# Patient Record
Sex: MALE | Race: BLACK OR AFRICAN AMERICAN | NOT HISPANIC OR LATINO | Employment: OTHER
[De-identification: names, ages, dates, MRNs, and addresses within clinical notes are randomized per-mention and may not be internally consistent; named-entity substitution may affect disease eponyms.]

---

## 2019-03-08 ENCOUNTER — DIAGNOSTIC TRANS (OUTPATIENT)
Dept: OTHER | Age: 74
End: 2019-03-08

## 2019-03-08 ENCOUNTER — HOSPITAL (OUTPATIENT)
Dept: OTHER | Age: 74
End: 2019-03-08
Attending: INTERNAL MEDICINE

## 2019-03-09 ENCOUNTER — HOSPITAL (OUTPATIENT)
Dept: OTHER | Age: 74
End: 2019-03-09

## 2019-03-09 LAB
ALBUMIN SERPL-MCNC: 2.6 GM/DL (ref 3.6–5.1)
ALBUMIN/GLOB SERPL: 0.5 {RATIO} (ref 1–2.4)
ALP SERPL-CCNC: 140 UNIT/L (ref 45–117)
ALT SERPL-CCNC: 61 UNIT/L
AMORPH SED URNS QL MICRO: ABNORMAL
ANALYZER ANC (IANC): ABNORMAL
ANION GAP SERPL CALC-SCNC: 11 MMOL/L (ref 10–20)
ANION GAP SERPL CALC-SCNC: 9 MMOL/L (ref 10–20)
APPEARANCE UR: ABNORMAL
AST SERPL-CCNC: 49 UNIT/L
BACTERIA #/AREA URNS HPF: ABNORMAL /HPF
BASOPHILS # BLD: 0 THOUSAND/MCL (ref 0–0.3)
BASOPHILS NFR BLD: 0 %
BILIRUB SERPL-MCNC: 2 MG/DL (ref 0.2–1)
BILIRUB UR QL: NEGATIVE
BUN SERPL-MCNC: 21 MG/DL (ref 6–20)
BUN SERPL-MCNC: 24 MG/DL (ref 6–20)
BUN/CREAT SERPL: 21 (ref 7–25)
BUN/CREAT SERPL: 24 (ref 7–25)
CALCIUM SERPL-MCNC: 8 MG/DL (ref 8.4–10.2)
CALCIUM SERPL-MCNC: 8.6 MG/DL (ref 8.4–10.2)
CAOX CRY URNS QL MICRO: ABNORMAL
CHLORIDE: 106 MMOL/L (ref 98–107)
CHLORIDE: 106 MMOL/L (ref 98–107)
CO2 SERPL-SCNC: 26 MMOL/L (ref 21–32)
CO2 SERPL-SCNC: 29 MMOL/L (ref 21–32)
COLOR UR: ABNORMAL
CREAT SERPL-MCNC: 1.01 MG/DL (ref 0.67–1.17)
CREAT SERPL-MCNC: 1.01 MG/DL (ref 0.67–1.17)
DIFFERENTIAL METHOD BLD: ABNORMAL
EOSINOPHIL # BLD: 0 THOUSAND/MCL (ref 0.1–0.5)
EOSINOPHIL NFR BLD: 0 %
EPITH CASTS #/AREA URNS LPF: ABNORMAL /[LPF]
ERYTHROCYTE [DISTWIDTH] IN BLOOD: 12.9 % (ref 11–15)
FATTY CASTS #/AREA URNS LPF: ABNORMAL /[LPF]
GLOBULIN SER-MCNC: 5.3 GM/DL (ref 2–4)
GLUCOSE SERPL-MCNC: 138 MG/DL (ref 65–99)
GLUCOSE SERPL-MCNC: 153 MG/DL (ref 65–99)
GLUCOSE UR-MCNC: NEGATIVE MG/DL
GRAN CASTS #/AREA URNS LPF: ABNORMAL /[LPF]
HEMATOCRIT: 38.1 % (ref 39–51)
HGB BLD-MCNC: 12.2 GM/DL (ref 13–17)
HGB UR QL: NEGATIVE
HYALINE CASTS #/AREA URNS LPF: ABNORMAL /LPF (ref 0–5)
IMM GRANULOCYTES # BLD AUTO: 0.1 THOUSAND/MCL (ref 0–0.2)
IMM GRANULOCYTES NFR BLD: 1 %
KETONES UR-MCNC: NEGATIVE MG/DL
LEUKOCYTE ESTERASE UR QL STRIP: NEGATIVE
LYMPHOCYTES # BLD: 1.7 THOUSAND/MCL (ref 1–4)
LYMPHOCYTES NFR BLD: 11 %
MAGNESIUM SERPL-MCNC: 2.3 MG/DL (ref 1.7–2.4)
MCH RBC QN AUTO: 28.3 PG (ref 26–34)
MCHC RBC AUTO-ENTMCNC: 32 GM/DL (ref 32–36.5)
MCV RBC AUTO: 88.4 FL (ref 78–100)
MIXED CELL CASTS #/AREA URNS LPF: ABNORMAL /[LPF]
MONOCYTES # BLD: 1.6 THOUSAND/MCL (ref 0.3–0.9)
MONOCYTES NFR BLD: 10 %
MUCOUS THREADS URNS QL MICRO: PRESENT
NEUTROPHILS # BLD: 12.1 THOUSAND/MCL (ref 1.8–7.7)
NEUTROPHILS NFR BLD: 78 %
NEUTS SEG NFR BLD: ABNORMAL %
NITRITE UR QL: NEGATIVE
NRBC (NRBCRE): 0 /100 WBC
PH UR: 5 UNIT (ref 5–7)
PLATELET # BLD: 383 THOUSAND/MCL (ref 140–450)
POTASSIUM SERPL-SCNC: 3.7 MMOL/L (ref 3.4–5.1)
POTASSIUM SERPL-SCNC: 3.7 MMOL/L (ref 3.4–5.1)
PROT SERPL-MCNC: 7.9 GM/DL (ref 6.4–8.2)
PROT UR QL: 100 MG/DL
RBC # BLD: 4.31 MILLION/MCL (ref 4.5–5.9)
RBC #/AREA URNS HPF: ABNORMAL /HPF (ref 0–3)
RBC CASTS #/AREA URNS LPF: ABNORMAL /[LPF]
RENAL EPI CELLS #/AREA URNS HPF: ABNORMAL /[HPF]
SODIUM SERPL-SCNC: 139 MMOL/L (ref 135–145)
SODIUM SERPL-SCNC: 140 MMOL/L (ref 135–145)
SP GR UR: >1.03 (ref 1–1.03)
SPECIMEN SOURCE: ABNORMAL
SPERM URNS QL MICRO: ABNORMAL
SQUAMOUS #/AREA URNS HPF: ABNORMAL /HPF (ref 0–5)
T VAGINALIS URNS QL MICRO: ABNORMAL
TRI-PHOS CRY URNS QL MICRO: ABNORMAL
TROPONIN I SERPL HS-MCNC: 0.15 NG/ML
TROPONIN I SERPL HS-MCNC: 0.17 NG/ML
TROPONIN I SERPL HS-MCNC: <0.02 NG/ML
TROPONIN I SERPL HS-MCNC: <0.02 NG/ML
TSH SERPL-ACNC: 0.8 MCUNIT/ML (ref 0.35–5)
URATE CRY URNS QL MICRO: ABNORMAL
URINE REFLEX: ABNORMAL
URNS CMNT MICRO: ABNORMAL
UROBILINOGEN UR QL: 4 MG/DL (ref 0–1)
WAXY CASTS #/AREA URNS LPF: ABNORMAL /[LPF]
WBC # BLD: 15.6 THOUSAND/MCL (ref 4.2–11)
WBC #/AREA URNS HPF: ABNORMAL /HPF (ref 0–5)
WBC CASTS #/AREA URNS LPF: ABNORMAL /[LPF]
YEAST HYPHAE URNS QL MICRO: ABNORMAL
YEAST URNS QL MICRO: ABNORMAL

## 2019-03-10 LAB
ANALYZER ANC (IANC): ABNORMAL
ANION GAP SERPL CALC-SCNC: 9 MMOL/L (ref 10–20)
BUN SERPL-MCNC: 24 MG/DL (ref 6–20)
BUN/CREAT SERPL: 25 (ref 7–25)
CALCIUM SERPL-MCNC: 8 MG/DL (ref 8.4–10.2)
CHLORIDE: 105 MMOL/L (ref 98–107)
CHOLEST SERPL-MCNC: 88 MG/DL
CHOLEST/HDLC SERPL: 5.9 {RATIO}
CK SERPL-CCNC: 115 UNIT/L (ref 39–308)
CO2 SERPL-SCNC: 28 MMOL/L (ref 21–32)
CREAT SERPL-MCNC: 0.95 MG/DL (ref 0.67–1.17)
ERYTHROCYTE [DISTWIDTH] IN BLOOD: 13.2 % (ref 11–15)
GLUCOSE SERPL-MCNC: 121 MG/DL (ref 65–99)
HDLC SERPL-MCNC: 15 MG/DL
HEMATOCRIT: 35.7 % (ref 39–51)
HGB BLD-MCNC: 11.2 GM/DL (ref 13–17)
LDLC SERPL CALC-MCNC: 46 MG/DL
MCH RBC QN AUTO: 27.9 PG (ref 26–34)
MCHC RBC AUTO-ENTMCNC: 31.4 GM/DL (ref 32–36.5)
MCV RBC AUTO: 88.8 FL (ref 78–100)
NONHDLC SERPL-MCNC: 73 MG/DL
NRBC (NRBCRE): 0 /100 WBC
NT-PROBNP SERPL-MCNC: 4112 PG/ML
PLATELET # BLD: 407 THOUSAND/MCL (ref 140–450)
POTASSIUM SERPL-SCNC: 4 MMOL/L (ref 3.4–5.1)
RBC # BLD: 4.02 MILLION/MCL (ref 4.5–5.9)
SODIUM SERPL-SCNC: 138 MMOL/L (ref 135–145)
TRIGLYCERIDE (TRIGP): 135 MG/DL
TROPONIN I SERPL HS-MCNC: 0.08 NG/ML
WBC # BLD: 12.3 THOUSAND/MCL (ref 4.2–11)

## 2019-03-11 LAB
ANALYZER ANC (IANC): ABNORMAL
ANION GAP SERPL CALC-SCNC: 10 MMOL/L (ref 10–20)
BASOPHILS # BLD: 0 THOUSAND/MCL (ref 0–0.3)
BASOPHILS NFR BLD: 0 %
BUN SERPL-MCNC: 25 MG/DL (ref 6–20)
BUN/CREAT SERPL: 29 (ref 7–25)
CALCIUM SERPL-MCNC: 8.1 MG/DL (ref 8.4–10.2)
CHLORIDE: 104 MMOL/L (ref 98–107)
CO2 SERPL-SCNC: 27 MMOL/L (ref 21–32)
CREAT SERPL-MCNC: 0.86 MG/DL (ref 0.67–1.17)
DIFFERENTIAL METHOD BLD: ABNORMAL
EOSINOPHIL # BLD: 0.2 THOUSAND/MCL (ref 0.1–0.5)
EOSINOPHIL NFR BLD: 2 %
ERYTHROCYTE [DISTWIDTH] IN BLOOD: 13.5 % (ref 11–15)
GLUCOSE SERPL-MCNC: 123 MG/DL (ref 65–99)
HEMATOCRIT: 34.6 % (ref 39–51)
HGB BLD-MCNC: 10.8 GM/DL (ref 13–17)
IMM GRANULOCYTES # BLD AUTO: 0.2 THOUSAND/MCL (ref 0–0.2)
IMM GRANULOCYTES NFR BLD: 2 %
INR PPP: 1.1
LYMPHOCYTES # BLD: 1.5 THOUSAND/MCL (ref 1–4)
LYMPHOCYTES NFR BLD: 14 %
MCH RBC QN AUTO: 27.8 PG (ref 26–34)
MCHC RBC AUTO-ENTMCNC: 31.2 GM/DL (ref 32–36.5)
MCV RBC AUTO: 89.2 FL (ref 78–100)
MONOCYTES # BLD: 1.1 THOUSAND/MCL (ref 0.3–0.9)
MONOCYTES NFR BLD: 11 %
NEUTROPHILS # BLD: 7.5 THOUSAND/MCL (ref 1.8–7.7)
NEUTROPHILS NFR BLD: 71 %
NEUTS SEG NFR BLD: ABNORMAL %
NRBC (NRBCRE): 0 /100 WBC
PLATELET # BLD: 414 THOUSAND/MCL (ref 140–450)
POTASSIUM SERPL-SCNC: 3.7 MMOL/L (ref 3.4–5.1)
PROTHROMBIN TIME: 11.7 SECONDS (ref 9.7–11.8)
PROTHROMBIN TIME: NORMAL
RBC # BLD: 3.88 MILLION/MCL (ref 4.5–5.9)
SODIUM SERPL-SCNC: 137 MMOL/L (ref 135–145)
WBC # BLD: 10.7 THOUSAND/MCL (ref 4.2–11)

## 2019-03-13 LAB
ANION GAP SERPL CALC-SCNC: 5 MMOL/L (ref 10–20)
BUN SERPL-MCNC: 16 MG/DL (ref 6–20)
BUN/CREAT SERPL: 20 (ref 7–25)
CALCIUM SERPL-MCNC: 8.1 MG/DL (ref 8.4–10.2)
CHLORIDE: 106 MMOL/L (ref 98–107)
CO2 SERPL-SCNC: 29 MMOL/L (ref 21–32)
CREAT SERPL-MCNC: 0.8 MG/DL (ref 0.67–1.17)
GLUCOSE SERPL-MCNC: 136 MG/DL (ref 65–99)
POTASSIUM SERPL-SCNC: 3.8 MMOL/L (ref 3.4–5.1)
SODIUM SERPL-SCNC: 136 MMOL/L (ref 135–145)

## 2019-03-14 LAB
ANALYZER ANC (IANC): ABNORMAL
ANION GAP SERPL CALC-SCNC: 9 MMOL/L (ref 10–20)
BASOPHILS # BLD: 0 THOUSAND/MCL (ref 0–0.3)
BASOPHILS NFR BLD: 0 %
BUN SERPL-MCNC: 14 MG/DL (ref 6–20)
BUN/CREAT SERPL: 19 (ref 7–25)
CALCIUM SERPL-MCNC: 8.2 MG/DL (ref 8.4–10.2)
CHLORIDE: 106 MMOL/L (ref 98–107)
CO2 SERPL-SCNC: 28 MMOL/L (ref 21–32)
CREAT SERPL-MCNC: 0.74 MG/DL (ref 0.67–1.17)
DIFFERENTIAL METHOD BLD: ABNORMAL
EOSINOPHIL # BLD: 0.2 THOUSAND/MCL (ref 0.1–0.5)
EOSINOPHIL NFR BLD: 2 %
ERYTHROCYTE [DISTWIDTH] IN BLOOD: 13.5 % (ref 11–15)
GLUCOSE SERPL-MCNC: 107 MG/DL (ref 65–99)
HEMATOCRIT: 33.9 % (ref 39–51)
HGB BLD-MCNC: 10.7 GM/DL (ref 13–17)
HYPOCHROMIA (HYPOC): ABNORMAL
IMM GRANULOCYTES # BLD AUTO: 0.3 THOUSAND/MCL (ref 0–0.2)
IMM GRANULOCYTES NFR BLD: 4 %
LARGE PLATELETS (PLTL): PRESENT
LYMPHOCYTES # BLD: 1.6 THOUSAND/MCL (ref 1–4)
LYMPHOCYTES NFR BLD: 17 %
MACROCYTOSIS (MACRO): ABNORMAL
MCH RBC QN AUTO: 27.9 PG (ref 26–34)
MCHC RBC AUTO-ENTMCNC: 31.6 GM/DL (ref 32–36.5)
MCV RBC AUTO: 88.5 FL (ref 78–100)
MONOCYTES # BLD: 0.7 THOUSAND/MCL (ref 0.3–0.9)
MONOCYTES NFR BLD: 8 %
NEUTROPHILS # BLD: 6.2 THOUSAND/MCL (ref 1.8–7.7)
NEUTROPHILS NFR BLD: 69 %
NEUTS SEG NFR BLD: ABNORMAL %
NRBC (NRBCRE): 0 /100 WBC
PLATELET # BLD: 421 THOUSAND/MCL (ref 140–450)
POTASSIUM SERPL-SCNC: 3.9 MMOL/L (ref 3.4–5.1)
RBC # BLD: 3.83 MILLION/MCL (ref 4.5–5.9)
SODIUM SERPL-SCNC: 139 MMOL/L (ref 135–145)
WBC # BLD: 9.1 THOUSAND/MCL (ref 4.2–11)

## 2021-05-19 ENCOUNTER — OFFICE VISIT (OUTPATIENT)
Dept: DERMATOLOGY | Age: 76
End: 2021-05-19

## 2021-05-19 VITALS
SYSTOLIC BLOOD PRESSURE: 181 MMHG | HEART RATE: 63 BPM | OXYGEN SATURATION: 99 % | HEIGHT: 65 IN | DIASTOLIC BLOOD PRESSURE: 84 MMHG | BODY MASS INDEX: 29.32 KG/M2 | WEIGHT: 176 LBS

## 2021-05-19 DIAGNOSIS — R20.2 NOTALGIA PARESTHETICA: Primary | ICD-10-CM

## 2021-05-19 PROCEDURE — 99203 OFFICE O/P NEW LOW 30 MIN: CPT | Performed by: DERMATOLOGY

## 2021-05-19 RX ORDER — METOPROLOL SUCCINATE 25 MG/1
25 TABLET, EXTENDED RELEASE ORAL DAILY
COMMUNITY

## 2021-05-19 RX ORDER — OLMESARTAN MEDOXOMIL 40 MG/1
TABLET ORAL
COMMUNITY
Start: 2021-04-16

## 2021-05-19 RX ORDER — HYDROXYZINE HYDROCHLORIDE 25 MG/1
25 TABLET, FILM COATED ORAL EVERY 6 HOURS PRN
COMMUNITY
Start: 2021-04-30

## 2021-05-19 RX ORDER — OMEGA-3/DHA/EPA/FISH OIL 35-113.5MG
1000 TABLET,CHEWABLE ORAL DAILY
COMMUNITY
Start: 2021-04-24

## 2021-05-19 RX ORDER — ERGOCALCIFEROL 1.25 MG/1
CAPSULE ORAL
COMMUNITY
Start: 2021-04-22

## 2021-07-14 ENCOUNTER — OFFICE VISIT (OUTPATIENT)
Dept: DERMATOLOGY | Age: 76
End: 2021-07-14

## 2021-07-14 VITALS
WEIGHT: 175.93 LBS | SYSTOLIC BLOOD PRESSURE: 156 MMHG | HEIGHT: 65 IN | BODY MASS INDEX: 29.31 KG/M2 | DIASTOLIC BLOOD PRESSURE: 71 MMHG

## 2021-07-14 DIAGNOSIS — R20.2 NOTALGIA PARESTHETICA: Primary | ICD-10-CM

## 2021-07-14 PROCEDURE — 99213 OFFICE O/P EST LOW 20 MIN: CPT | Performed by: DERMATOLOGY

## 2022-08-22 DIAGNOSIS — Z13.6 ENCOUNTER FOR SCREENING FOR CARDIOVASCULAR DISORDERS: Primary | ICD-10-CM

## 2022-08-22 DIAGNOSIS — R22.43 LOCALIZED SWELLING, MASS AND LUMP, LOWER LIMB, BILATERAL: ICD-10-CM

## 2022-08-23 ENCOUNTER — HOSPITAL ENCOUNTER (OUTPATIENT)
Dept: ULTRASOUND IMAGING | Age: 77
Discharge: HOME | End: 2022-08-23
Attending: NURSE PRACTITIONER

## 2022-08-23 DIAGNOSIS — R22.43 LOCALIZED SWELLING, MASS AND LUMP, LOWER LIMB, BILATERAL: ICD-10-CM

## 2022-08-23 DIAGNOSIS — Z13.6 ENCOUNTER FOR SCREENING FOR CARDIOVASCULAR DISORDERS: ICD-10-CM

## 2022-08-23 PROCEDURE — 93925 LOWER EXTREMITY STUDY: CPT

## 2022-08-23 PROCEDURE — 93970 EXTREMITY STUDY: CPT

## 2023-12-16 ENCOUNTER — APPOINTMENT (OUTPATIENT)
Dept: MRI IMAGING | Facility: HOSPITAL | Age: 78
DRG: 304 | End: 2023-12-16
Attending: EMERGENCY MEDICINE
Payer: MEDICARE

## 2023-12-16 ENCOUNTER — APPOINTMENT (OUTPATIENT)
Dept: GENERAL RADIOLOGY | Facility: HOSPITAL | Age: 78
DRG: 304 | End: 2023-12-16
Attending: EMERGENCY MEDICINE
Payer: MEDICARE

## 2023-12-16 ENCOUNTER — APPOINTMENT (OUTPATIENT)
Dept: MRI IMAGING | Facility: HOSPITAL | Age: 78
End: 2023-12-16
Attending: EMERGENCY MEDICINE
Payer: MEDICARE

## 2023-12-16 ENCOUNTER — APPOINTMENT (OUTPATIENT)
Dept: CT IMAGING | Facility: HOSPITAL | Age: 78
End: 2023-12-16
Attending: EMERGENCY MEDICINE
Payer: MEDICARE

## 2023-12-16 ENCOUNTER — APPOINTMENT (OUTPATIENT)
Dept: GENERAL RADIOLOGY | Facility: HOSPITAL | Age: 78
End: 2023-12-16
Attending: EMERGENCY MEDICINE
Payer: MEDICARE

## 2023-12-16 ENCOUNTER — HOSPITAL ENCOUNTER (INPATIENT)
Facility: HOSPITAL | Age: 78
LOS: 4 days | Discharge: NURSING, REHAB OR LONG-TERM CARE | End: 2023-12-20
Attending: EMERGENCY MEDICINE | Admitting: HOSPITALIST
Payer: MEDICARE

## 2023-12-16 ENCOUNTER — APPOINTMENT (OUTPATIENT)
Dept: CT IMAGING | Facility: HOSPITAL | Age: 78
DRG: 304 | End: 2023-12-16
Attending: EMERGENCY MEDICINE
Payer: MEDICARE

## 2023-12-16 ENCOUNTER — HOSPITAL ENCOUNTER (INPATIENT)
Facility: HOSPITAL | Age: 78
LOS: 4 days | Discharge: NURSING, REHAB OR LONG-TERM CARE | DRG: 304 | End: 2023-12-20
Attending: EMERGENCY MEDICINE | Admitting: HOSPITALIST
Payer: MEDICARE

## 2023-12-16 DIAGNOSIS — I63.9 CEREBROVASCULAR ACCIDENT (CVA), UNSPECIFIED MECHANISM (HCC): ICD-10-CM

## 2023-12-16 DIAGNOSIS — G45.9 TIA (TRANSIENT ISCHEMIC ATTACK): ICD-10-CM

## 2023-12-16 DIAGNOSIS — I16.0 HYPERTENSIVE URGENCY: Primary | ICD-10-CM

## 2023-12-16 PROBLEM — R73.9 HYPERGLYCEMIA: Status: ACTIVE | Noted: 2023-12-16

## 2023-12-16 PROBLEM — E87.6 HYPOKALEMIA: Status: ACTIVE | Noted: 2023-12-16

## 2023-12-16 LAB
ALBUMIN SERPL-MCNC: 3.7 G/DL (ref 3.4–5)
ALBUMIN/GLOB SERPL: 0.9 {RATIO} (ref 1–2)
ALP LIVER SERPL-CCNC: 115 U/L
ALT SERPL-CCNC: 27 U/L
ANION GAP SERPL CALC-SCNC: 4 MMOL/L (ref 0–18)
APTT PPP: 34.2 SECONDS (ref 23.3–35.6)
AST SERPL-CCNC: 26 U/L (ref 15–37)
BASOPHILS # BLD AUTO: 0.02 X10(3) UL (ref 0–0.2)
BASOPHILS NFR BLD AUTO: 0.3 %
BILIRUB SERPL-MCNC: 0.6 MG/DL (ref 0.1–2)
BUN BLD-MCNC: 13 MG/DL (ref 9–23)
CALCIUM BLD-MCNC: 8.5 MG/DL (ref 8.5–10.1)
CHLORIDE SERPL-SCNC: 105 MMOL/L (ref 98–112)
CO2 SERPL-SCNC: 30 MMOL/L (ref 21–32)
CREAT BLD-MCNC: 1.17 MG/DL
EGFRCR SERPLBLD CKD-EPI 2021: 64 ML/MIN/1.73M2 (ref 60–?)
EOSINOPHIL # BLD AUTO: 0.11 X10(3) UL (ref 0–0.7)
EOSINOPHIL NFR BLD AUTO: 1.9 %
ERYTHROCYTE [DISTWIDTH] IN BLOOD BY AUTOMATED COUNT: 11.8 %
GLOBULIN PLAS-MCNC: 3.9 G/DL (ref 2.8–4.4)
GLUCOSE BLD-MCNC: 131 MG/DL (ref 70–99)
HCT VFR BLD AUTO: 50.7 %
HGB BLD-MCNC: 16.2 G/DL
IMM GRANULOCYTES # BLD AUTO: 0.03 X10(3) UL (ref 0–1)
IMM GRANULOCYTES NFR BLD: 0.5 %
INR BLD: 1.12 (ref 0.8–1.2)
LYMPHOCYTES # BLD AUTO: 2.18 X10(3) UL (ref 1–4)
LYMPHOCYTES NFR BLD AUTO: 37.8 %
MCH RBC QN AUTO: 28.8 PG (ref 26–34)
MCHC RBC AUTO-ENTMCNC: 32 G/DL (ref 31–37)
MCV RBC AUTO: 90.2 FL
MONOCYTES # BLD AUTO: 0.64 X10(3) UL (ref 0.1–1)
MONOCYTES NFR BLD AUTO: 11.1 %
NEUTROPHILS # BLD AUTO: 2.79 X10 (3) UL (ref 1.5–7.7)
NEUTROPHILS # BLD AUTO: 2.79 X10(3) UL (ref 1.5–7.7)
NEUTROPHILS NFR BLD AUTO: 48.4 %
OSMOLALITY SERPL CALC.SUM OF ELEC: 290 MOSM/KG (ref 275–295)
PLATELET # BLD AUTO: 203 10(3)UL (ref 150–450)
POTASSIUM SERPL-SCNC: 3.5 MMOL/L (ref 3.5–5.1)
PROT SERPL-MCNC: 7.6 G/DL (ref 6.4–8.2)
PROTHROMBIN TIME: 14.4 SECONDS (ref 11.6–14.8)
RBC # BLD AUTO: 5.62 X10(6)UL
SODIUM SERPL-SCNC: 139 MMOL/L (ref 136–145)
TROPONIN I SERPL HS-MCNC: 11 NG/L
WBC # BLD AUTO: 5.8 X10(3) UL (ref 4–11)

## 2023-12-16 PROCEDURE — 71045 X-RAY EXAM CHEST 1 VIEW: CPT | Performed by: EMERGENCY MEDICINE

## 2023-12-16 PROCEDURE — 70498 CT ANGIOGRAPHY NECK: CPT | Performed by: EMERGENCY MEDICINE

## 2023-12-16 PROCEDURE — 99223 1ST HOSP IP/OBS HIGH 75: CPT | Performed by: HOSPITALIST

## 2023-12-16 PROCEDURE — 70551 MRI BRAIN STEM W/O DYE: CPT | Performed by: EMERGENCY MEDICINE

## 2023-12-16 PROCEDURE — 70496 CT ANGIOGRAPHY HEAD: CPT | Performed by: EMERGENCY MEDICINE

## 2023-12-16 PROCEDURE — 70544 MR ANGIOGRAPHY HEAD W/O DYE: CPT | Performed by: EMERGENCY MEDICINE

## 2023-12-16 RX ORDER — HYDRALAZINE HYDROCHLORIDE 20 MG/ML
10 INJECTION INTRAMUSCULAR; INTRAVENOUS ONCE
Status: COMPLETED | OUTPATIENT
Start: 2023-12-16 | End: 2023-12-16

## 2023-12-16 RX ORDER — HYDRALAZINE HYDROCHLORIDE 20 MG/ML
10 INJECTION INTRAMUSCULAR; INTRAVENOUS EVERY 6 HOURS PRN
Status: DISCONTINUED | OUTPATIENT
Start: 2023-12-16 | End: 2023-12-20

## 2023-12-16 RX ORDER — ONDANSETRON 2 MG/ML
4 INJECTION INTRAMUSCULAR; INTRAVENOUS EVERY 6 HOURS PRN
Status: DISCONTINUED | OUTPATIENT
Start: 2023-12-16 | End: 2023-12-20

## 2023-12-16 RX ORDER — LABETALOL HYDROCHLORIDE 5 MG/ML
10 INJECTION, SOLUTION INTRAVENOUS EVERY 10 MIN PRN
Status: DISCONTINUED | OUTPATIENT
Start: 2023-12-16 | End: 2023-12-20

## 2023-12-16 RX ORDER — LOSARTAN POTASSIUM 50 MG/1
50 TABLET ORAL DAILY
Status: DISCONTINUED | OUTPATIENT
Start: 2023-12-17 | End: 2023-12-20

## 2023-12-16 RX ORDER — LOSARTAN POTASSIUM 25 MG/1
50 TABLET ORAL DAILY
COMMUNITY

## 2023-12-16 RX ORDER — ATORVASTATIN CALCIUM 40 MG/1
40 TABLET, FILM COATED ORAL NIGHTLY
Status: DISCONTINUED | OUTPATIENT
Start: 2023-12-16 | End: 2023-12-20

## 2023-12-16 RX ORDER — ASPIRIN 81 MG/1
81 TABLET, CHEWABLE ORAL ONCE
Status: COMPLETED | OUTPATIENT
Start: 2023-12-16 | End: 2023-12-16

## 2023-12-16 RX ORDER — IOHEXOL 350 MG/ML
75 INJECTION, SOLUTION INTRAVENOUS
Status: COMPLETED | OUTPATIENT
Start: 2023-12-16 | End: 2023-12-16

## 2023-12-16 RX ORDER — MAGNESIUM 30 MG
30 TABLET ORAL 2 TIMES DAILY
COMMUNITY

## 2023-12-16 RX ORDER — FUROSEMIDE 40 MG/1
40 TABLET ORAL DAILY
COMMUNITY

## 2023-12-16 RX ORDER — ASPIRIN 300 MG/1
300 SUPPOSITORY RECTAL DAILY
Status: DISCONTINUED | OUTPATIENT
Start: 2023-12-16 | End: 2023-12-18

## 2023-12-16 RX ORDER — METOPROLOL SUCCINATE 50 MG/1
50 TABLET, EXTENDED RELEASE ORAL DAILY
COMMUNITY

## 2023-12-16 RX ORDER — HYDRALAZINE HYDROCHLORIDE 20 MG/ML
10 INJECTION INTRAMUSCULAR; INTRAVENOUS EVERY 2 HOUR PRN
Status: DISCONTINUED | OUTPATIENT
Start: 2023-12-16 | End: 2023-12-20

## 2023-12-16 RX ORDER — ACETAMINOPHEN 325 MG/1
650 TABLET ORAL EVERY 4 HOURS PRN
Status: DISCONTINUED | OUTPATIENT
Start: 2023-12-16 | End: 2023-12-20

## 2023-12-16 RX ORDER — ACETAMINOPHEN 650 MG/1
650 SUPPOSITORY RECTAL EVERY 4 HOURS PRN
Status: DISCONTINUED | OUTPATIENT
Start: 2023-12-16 | End: 2023-12-20

## 2023-12-16 RX ORDER — HEPARIN SODIUM 5000 [USP'U]/ML
5000 INJECTION, SOLUTION INTRAVENOUS; SUBCUTANEOUS EVERY 8 HOURS SCHEDULED
Status: DISCONTINUED | OUTPATIENT
Start: 2023-12-16 | End: 2023-12-20

## 2023-12-16 RX ORDER — METOCLOPRAMIDE HYDROCHLORIDE 5 MG/ML
5 INJECTION INTRAMUSCULAR; INTRAVENOUS EVERY 8 HOURS PRN
Status: DISCONTINUED | OUTPATIENT
Start: 2023-12-16 | End: 2023-12-20

## 2023-12-16 RX ORDER — ASPIRIN 325 MG
325 TABLET ORAL DAILY
Status: DISCONTINUED | OUTPATIENT
Start: 2023-12-16 | End: 2023-12-18

## 2023-12-16 RX ORDER — METOPROLOL SUCCINATE 50 MG/1
50 TABLET, EXTENDED RELEASE ORAL
Status: DISCONTINUED | OUTPATIENT
Start: 2023-12-17 | End: 2023-12-20

## 2023-12-16 RX ORDER — SODIUM CHLORIDE 9 MG/ML
INJECTION, SOLUTION INTRAVENOUS CONTINUOUS
Status: DISCONTINUED | OUTPATIENT
Start: 2023-12-16 | End: 2023-12-17

## 2023-12-16 NOTE — ED INITIAL ASSESSMENT (HPI)
Patient to ED c/o left leg heaviness and left sided facial tingling that began two days ago as well. Patient also having elevated BP, has not been taking prescribed medications consistently.

## 2023-12-17 ENCOUNTER — APPOINTMENT (OUTPATIENT)
Dept: CV DIAGNOSTICS | Facility: HOSPITAL | Age: 78
DRG: 304 | End: 2023-12-17
Attending: HOSPITALIST
Payer: MEDICARE

## 2023-12-17 ENCOUNTER — APPOINTMENT (OUTPATIENT)
Dept: CV DIAGNOSTICS | Facility: HOSPITAL | Age: 78
End: 2023-12-17
Attending: HOSPITALIST
Payer: MEDICARE

## 2023-12-17 LAB
ATRIAL RATE: 76 BPM
CHOLEST SERPL-MCNC: 125 MG/DL (ref ?–200)
EST. AVERAGE GLUCOSE BLD GHB EST-MCNC: 128 MG/DL (ref 68–126)
HBA1C MFR BLD: 6.1 % (ref ?–5.7)
HDLC SERPL-MCNC: 47 MG/DL (ref 40–59)
LDLC SERPL CALC-MCNC: 57 MG/DL (ref ?–100)
NONHDLC SERPL-MCNC: 78 MG/DL (ref ?–130)
P AXIS: 73 DEGREES
P-R INTERVAL: 180 MS
Q-T INTERVAL: 364 MS
QRS DURATION: 96 MS
QTC CALCULATION (BEZET): 409 MS
R AXIS: 38 DEGREES
T AXIS: 88 DEGREES
TRIGL SERPL-MCNC: 119 MG/DL (ref 30–149)
VENTRICULAR RATE: 76 BPM
VLDLC SERPL CALC-MCNC: 17 MG/DL (ref 0–30)

## 2023-12-17 PROCEDURE — 93306 TTE W/DOPPLER COMPLETE: CPT | Performed by: HOSPITALIST

## 2023-12-17 PROCEDURE — 99233 SBSQ HOSP IP/OBS HIGH 50: CPT | Performed by: HOSPITALIST

## 2023-12-17 PROCEDURE — 99223 1ST HOSP IP/OBS HIGH 75: CPT | Performed by: OTHER

## 2023-12-17 RX ORDER — CLOPIDOGREL BISULFATE 75 MG/1
75 TABLET ORAL DAILY
Status: DISCONTINUED | OUTPATIENT
Start: 2023-12-17 | End: 2023-12-20

## 2023-12-17 NOTE — ED QUICK NOTES
Orders for admission, patient is aware of plan and ready to go upstairs. Any questions, please call ED RN paul  at extension [phone].      Patient Covid vaccination status: Unvaccinated     COVID Test Ordered in ED: None    COVID Suspicion at Admission: N/A    Running Infusions:  None    Mental Status/LOC at time of transport: a/ox4    Other pertinent information: MRI ETA 2200= PT MAY GO TO FLOOR FIRST IF BED AVAIL  CIWA score: N/A   NIH score:  0

## 2023-12-17 NOTE — SLP NOTE
ADULT SWALLOWING EVALUATION    ASSESSMENT    PERTINENT BACKGROUND INFORMATION:  Patient is a 66year-old male who was admitted on 12/16/23 with TIA (transient ischemic attack) [G45.9] Hypertensive urgency [I16.0]. Imaging results reviewed and noted below. Currently on RA with adequate SPO2 saturations of 98%. PMH is significant for HTN which may impact care. Patient resides at home with spouse. Prior to this hospitalization, patient was consuming a regular diet with thin liquids. Patient indicates no speech or swallow complaints. Son confirmed that his speech sounds normal to him and that it probably sounded garbled in the beginning of my visit as he was relaxing in chair and maybe napping. Passed RN swallow screening. Patient is currently on a regular consistency diet with thin liquids. ASSESSMENT:   Patient was awake and alert. Able to maintain midline position with adequate trunk and head control when upright in bedside chair. Oral motor mechanism exam was essentially unremarkable except for reduced coordination with increasing speed. Speech clarity was inconsistent during this visit with moments of 100% clarity to dysarthria with reduced intelligibility in context known. Son had difficulty understanding him at times and was asking his father to repeat himself. However, son stated that he thinks he sounds normal despite waxing and waning of speech clarity. Patient presented with WNL oropharyngeal skills with absent signs/symptoms of aspiration during the controlled conditions of this exam and when swallow mechanism was challenged via consecutive swallow of thin liquid by straw. He does have an impulsive nature with large bites/sips but was able to manage. Please see below objective section for details. No further follow-up with swallow skills warranted. RECOMMENDATIONS/PLAN:  -  Recommend continue regular diet/thin liquids with implementation of general swallow support strategies outlined below. - Cognitive-communication evaluation to be completed if 2nd MRI is stroke positive. EDUCATION:  - Patient/family verbalized understanding to results and recommendations of this evaluation and was in agreement. - Spoke with RN Clemencia Kingsley. RECOMMENDATIONS   Diet Recommendations - Solids: Regular  Diet Recommendations - Liquids: Thin Liquids                           Aspiration Precautions: Upright position; Slow rate;Small bites and sips  Medication Administration Recommendations: No restrictions  Treatment Plan/Recommendations:  (cog-comm eval if 2nd MRI is stroke positive)  Discharge Recommendations/Plan: Undetermined    HISTORY   MEDICAL HISTORY  Reason for Referral: Stroke protocol    Problem List  Principal Problem:    Hypertensive urgency  Active Problems:    Hypokalemia    Hyperglycemia    TIA (transient ischemic attack)      Past Medical History  Past Medical History:   Diagnosis Date    Essential hypertension     High blood pressure      Past Surgical History:   Procedure Laterality Date    REPAIR ING HERNIA,5+Y/O,REDUCIBL           Prior Living Situation: Home with spouse  Diet Prior to Admission: Regular; Thin liquids  Precautions: Aspiration    Patient/Family Goals: not stated    SWALLOWING HISTORY  Current Diet Consistency: Regular; Thin liquids  Dysphagia History: none    IMAGING  MRI BRAIN/MRA BRAIN   CONCLUSION:    1. No evidence of an acute infarct. 2. Sequelae of marked severe chronic small vessel ischemic disease. 3. Mild global parenchymal volume loss. 4. Multiple foci of gradient susceptibility in the brain parenchyma may be sequelae of chronic hemorrhagic products. 5. 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 The left posterior cerebral artery distal to this is widely patent.       LOCATION:  [facility]      Dictated by (CST): Mohan Esqueda MD on 12/16/2023 at 11:10 PM       Finalized by (CST): Mohan Esqueda MD on 12/16/2023 at 11:16 PM         XR CHEST AP PORTABLE   CONCLUSION:  Minimal pulmonary vascular congestion. No consolidation. LOCATION:  [facility]      Dictated by (CST): Freddie Rodriguez MD on 12/16/2023 at 7:21 PM       Finalized by (CST): Freddie Rodriguez MD on 12/16/2023 at 7:23 PM     SUBJECTIVE   Son present for this visit who confirmed that his speech sounds the same before this hospitalization. OBJECTIVE   ORAL MOTOR EXAMINATION  Dentition: Natural  Symmetry: Within Functional Limits  Strength: Within Functional Limits     Range of Motion: Within Functional Limits  Rate of Motion: Reduced    Voice Quality: Clear  Respiratory Status: Unlabored  Consistencies Trialed: Thin liquids;Puree;Hard solid  Method of Presentation: Self presentation  Patient Positioning: Upright;Midline    Oral Phase of Swallow: Within Functional Limits                      Pharyngeal Phase of Swallow: Within Functional Limits           (Please note: Silent aspiration cannot be evaluated clinically.  Videofluoroscopic Swallow Study is required to rule-out silent aspiration.)    Esophageal Phase of Swallow: No complaints consistent with possible esophageal involvement  Comments:               GOALS  Goal #1 Cognitive-communication evaluation if 2nd MRI is stroke positive  Goal Established           FOLLOW UP  Treatment Plan/Recommendations:  (cog-comm eval if 2nd MRI is stroke positive)  Number of Visits to Meet Established Goals: 1  Follow Up Needed (Documentation Required): Yes  SLP Follow-up Date: 12/18/23    Thank you for your referral.   If you have any questions, please contact PETE Harding

## 2023-12-17 NOTE — PLAN OF CARE
Assumed care at 0730  Alert and oriented x4  RA. NSR on tele. VSS  Denies any pain  Neuros q2 until 12pm. No new neuro changes. Continues to have intermittent numbness/tingling on left side and left leg weakness  Up 2 with walker and gait belt, pt very unsteady and drags left leg  Echo done.    MRI brain needed  PT/OT rec AR  Pt/family updated on POC  Safety precautions in place

## 2023-12-17 NOTE — PHYSICAL THERAPY NOTE
PHYSICAL THERAPY EVALUATION - INPATIENT     Room Number: 5660/0194-O  Evaluation Date: 12/17/2023  Type of Evaluation: Initial  Physician Order: PT Eval and Treat    Presenting Problem: L side weakness and numnbess, hypertensive urgency  Co-Morbidities : HTN  Reason for Therapy: Mobility Dysfunction and Discharge Planning    History related to current admission: Patient is a 66year old male admitted on 12/16/2023 from home for L sided weakness and numbness. Pt diagnosed with hypertensive urgency. SPB in . Also with possible TIA. MRI brain negative for acute event per imaging report. ASSESSMENT   In this PT evaluation, the patient presents with the following impairments dec coordination L LE, dec functional strength, dec dynamic and static standing balance. These impairments and comorbidities manifest themselves as functional limitations in independent bed mobility, transfers, gait and stair negotiation. The patient is below baseline and would benefit from skilled inpatient PT to address the above deficits to assist patient in returning to prior to level of function. Functional outcome measures completed include AMPAC. The AM-PAC '6-Clicks' Inpatient Basic Mobility Short Form was completed and this patient is demonstrating a Approx Degree of Impairment: 54.16%  degree of impairment in mobility. Research supports that patients with this level of impairment may benefit from Acute rehabilitation. Pt is typically ind without assistive device, good activity tolerance, motivated to participate in therapies and would benefit from coordinated, intensive therapy with oversight of physiatry and rehab nursing in order to achieve max level of independence. DISCHARGE RECOMMENDATIONS  PT Discharge Recommendations: Acute rehabilitation    PLAN  PT Treatment Plan: Bed mobility; Body mechanics; Endurance; Energy conservation;Patient education;Gait training;Neuromuscular re-educate;Strengthening;Transfer training;Balance training  Rehab Potential : Good  Frequency (Obs): 3-5x/week  Number of Visits to Meet Established Goals: 4      CURRENT GOALS    Goal #1 Patient is able to demonstrate supine - sit EOB @ level: supervision     Goal #2 Patient is able to demonstrate transfers EOB to/from Chair/Wheelchair at assistance level: supervision     Goal #3 Patient is able to ambulate 100 feet with assist device: walker - rolling at assistance level: supervision     Goal #4    Goal #5    Goal #6    Goal Comments: Goals established on 2023    HOME SITUATION  Type of Home: [address]: One level  Stairs to Enter : 3  Railing: Yes  Stairs to Bedroom: 0       Lives With: Significant other  Drives: Yes  Patient Owned Equipment: None  Patient Regularly Uses: Glasses    Prior Level of [place]: Pt typically independent with all self care and mobility, denies recent history of falls. Lives in [place], was visiting dtr in [facility] when symptoms began.     SUBJECTIVE  \"It feels heavy\"      OBJECTIVE  Precautions: Bed/chair alarm (-190)  Fall Risk: High fall risk    WEIGHT BEARING RESTRICTION  Weight Bearing Restriction: None                PAIN ASSESSMENT  Ratin          COGNITION  Arousal/Alertness:  appropriate responses to stimuli  Orientation Level:  oriented x4  Following Commands:  follows one step commands consistently  Motor Planning: impaired  Awareness of Deficits:  fully aware of deficits    RANGE OF MOTION AND STRENGTH ASSESSMENT  Upper extremity ROM and strength: see OT note    Lower extremity ROM is within functional limits     Lower extremity strength is within functional limits Left Hip flexion  5/5  Left Knee extension  5/5  Left Knee flexion  5/5  Left Dorsiflexion  5/5  *noted 5/5 strength when placed in position, difficulty coordinating movement for testing    BALANCE  Static Sitting: Good  Dynamic Sitting: Good  Static Standing: Fair -  Dynamic Standing: Poor    ADDITIONAL TESTS  Additional Tests: Modified [place]              Modified [place]: 5                  ACTIVITY TOLERANCE  Pulse: 78  Heart Rate Source: Monitor     BP: 158/78  BP Location: Left arm  BP Method: Automatic  Patient Position: Sitting    O2 WALK       NEUROLOGICAL FINDINGS  Neurological Findings: Sensation; Tone           Sensation: intact to light touch L LE  Tone: wfl      AM-PAC '6-Clicks' INPATIENT SHORT FORM - BASIC MOBILITY  How much difficulty does the patient currently have. .. Patient Difficulty: Turning over in bed (including adjusting bedclothes, sheets and blankets)?: A Little   Patient Difficulty: Sitting down on and standing up from a chair with arms (e.g., wheelchair, bedside commode, etc.): A Little   Patient Difficulty: Moving from lying on back to sitting on the side of the bed?: A Little   How much help from another person does the patient currently need. .. Help from Another: Moving to and from a bed to a chair (including a wheelchair)?: A Little   Help from Another: Need to walk in hospital room?: A Lot   Help from Another: Climbing 3-5 steps with a railing?: A Lot       AM-PAC Score:  Raw Score: 16   Approx Degree of Impairment: 54.16%   Standardized Score (AM-PAC Scale): 40.78   CMS Modifier (G-Code): CK    FUNCTIONAL ABILITY STATUS  Gait Assessment   Functional Mobility/Gait Assessment  Gait Assistance: Moderate assistance  Distance (ft): 10  Assistive Device: Rolling walker  Pattern: L Flexed knee;L Foot drag;Shuffle    Skilled Therapy Provided     Bed Mobility:  Rolling: mod I  Supine to sit: min assist with L LE only   Sit to supine: NT     Transfer Mobility:  Sit to stand: mod assist   Stand to sit: min assist  Gait = mod assist with balance and L LE movement, assist with walker mgmt, cues for posture. Therapist's Comments: Pt presents sidelying in bed, agreeable to PT. Mobility as above. Noted decreased coordination L LE, see OT note for L UE deficits. Pt with good sitting balance. Leans slightly to left in standing and during gait. SLow gait speed with difficulty advancing L LE. Exercise/Education Provided:  Bed mobility  Body mechanics  Functional activity tolerated  Gait training  Posture  Transfer training    Patient End of Session: Up in chair;Needs met;Call light within reach;RN aware of session/findings; All patient questions and concerns addressed; Family present; Alarm set      Patient Evaluation Complexity Level:  History Low - no personal factors and/or co-morbidities   Examination of body systems Moderate - addressing a total of 3 or more elements   Clinical Presentation Moderate - Evolving   Clinical Decision Making Low - Stable       PT Session Time: 30 minutes  Gait Training: 10 minutes  Therapeutic Activity: 10 minutes

## 2023-12-17 NOTE — CM/SW NOTE
Acute rehab recommended by therapy team per progress note review; CM ordered Consult to Physical Medicine & Rehab (Physiatry) for acute rehab evaluation per protocol. CM/SW to follow up for Physiatry recommendation and additional discharge planning needs.     Myriam Sky RN Case Manager [id number]

## 2023-12-17 NOTE — PLAN OF CARE
Stroke booklet given to patient; the following education was provided:     What is stroke  BEFAST - Stroke warning sings and symptoms  How to initiate EMS  Secondary stroke prevention and personalized risk factors: including HTN  Lifestyle modifications (nutrition and exercise)  Post-stroke recovery and follow-up  Community resources (stroke support group and non-emergent stroke line)  Patient's identified goal: \"I will check blood pressures every day and log them when I get home. \"    Patient's family present during education. They are all receptive to teachings. All pertinent questions and concerns were addressed. Patient has chosen Adis Wiggins (daughter) as his/her designated care partner. He/she can be reached at [phone].       RN Stroke Navigator team  [phone]

## 2023-12-17 NOTE — PLAN OF CARE
Pt. A&O x4, on RA, NSR on tele. BP slightly elevated but within parameters. No c/o pain. Only c/o slight tingling in left side of lips but only when standing. MRI negative so stroke protocol was discontinued by the hospitalist. Saline locked. Fall and safety precautions in place. Plan of care ongoing. 0400- Re-emergence of symptoms around 0400 when pt. Ambulated with staff to the bathroom. L leg felt very heavy per pt. and did not bear weight easily, L arm numbness, MD notified. Problem: CARDIOVASCULAR - ADULT  Goal: Maintains optimal cardiac output and hemodynamic stability  Description: INTERVENTIONS:  - Monitor vital signs, rhythm, and trends  - Monitor for bleeding, hypotension and signs of decreased cardiac output  - Evaluate effectiveness of vasoactive medications to optimize hemodynamic stability  - Monitor arterial and/or venous puncture sites for bleeding and/or hematoma  - Assess quality of pulses, skin color and temperature  - Assess for signs of decreased coronary artery perfusion - ex. Angina  - Evaluate fluid balance, assess for edema, trend weights  Outcome: Progressing     Problem: NEUROLOGICAL - ADULT  Goal: Achieves stable or improved neurological status  Description: INTERVENTIONS  - Assess for and report changes in neurological status  - Initiate measures to prevent increased intracranial pressure  - Maintain blood pressure and fluid volume within ordered parameters to optimize cerebral perfusion and minimize risk of hemorrhage  - Monitor temperature, glucose, and sodium.  Initiate appropriate interventions as ordered  Outcome: Progressing

## 2023-12-18 ENCOUNTER — TELEPHONE (OUTPATIENT)
Dept: NEUROLOGY | Facility: CLINIC | Age: 78
End: 2023-12-18

## 2023-12-18 ENCOUNTER — APPOINTMENT (OUTPATIENT)
Dept: MRI IMAGING | Facility: HOSPITAL | Age: 78
End: 2023-12-18
Attending: NURSE PRACTITIONER
Payer: MEDICARE

## 2023-12-18 ENCOUNTER — APPOINTMENT (OUTPATIENT)
Dept: MRI IMAGING | Facility: HOSPITAL | Age: 78
DRG: 304 | End: 2023-12-18
Attending: NURSE PRACTITIONER
Payer: MEDICARE

## 2023-12-18 PROBLEM — I63.81 ACUTE ISCHEMIC VBA THALAMIC STROKE, RIGHT (HCC): Status: ACTIVE | Noted: 2023-12-18

## 2023-12-18 PROCEDURE — 99233 SBSQ HOSP IP/OBS HIGH 50: CPT | Performed by: HOSPITALIST

## 2023-12-18 PROCEDURE — 99233 SBSQ HOSP IP/OBS HIGH 50: CPT | Performed by: INTERNAL MEDICINE

## 2023-12-18 PROCEDURE — 70551 MRI BRAIN STEM W/O DYE: CPT | Performed by: NURSE PRACTITIONER

## 2023-12-18 RX ORDER — ASPIRIN 81 MG/1
81 TABLET, CHEWABLE ORAL DAILY
Qty: 30 TABLET | Refills: 1 | Status: SHIPPED | OUTPATIENT
Start: 2023-12-19

## 2023-12-18 RX ORDER — CLOPIDOGREL BISULFATE 75 MG/1
75 TABLET ORAL DAILY
Qty: 30 TABLET | Refills: 1 | Status: SHIPPED | OUTPATIENT
Start: 2023-12-19

## 2023-12-18 RX ORDER — ASPIRIN 81 MG/1
81 TABLET, CHEWABLE ORAL DAILY
Status: DISCONTINUED | OUTPATIENT
Start: 2023-12-19 | End: 2023-12-20

## 2023-12-18 RX ORDER — ATORVASTATIN CALCIUM 40 MG/1
40 TABLET, FILM COATED ORAL NIGHTLY
Qty: 30 TABLET | Refills: 1 | Status: SHIPPED | OUTPATIENT
Start: 2023-12-18

## 2023-12-18 NOTE — TELEPHONE ENCOUNTER
TIA CLINIC SCREENING    1. Date of ED visit/TIA diagnosis:  12/16/2023   Time of discharge from ED:  N/A    2. Is patient currently admitted? Yes   If YES - TIA Clinic Appointment not required. 3. Does patient already see an [facility] neurologist?  No  Name:     (if YES - TIA Clinic Appointment not required. Route message on to patient's neurologist)    4. Patient's current anti-platelet therapy:  Plavix    5. Patient's current statin therapy:  Atorvastatin    6. Has 2D Echo with bubble test been done? Yes  Date:  12/17/2023    7. Is TIA Clinic Appointment indicated? No     If YES - route encounter to [address] [place] [address] to schedule patient for appointment NO LATER THAN 48 HOURS AFTER ED DISCHARGE. If UNSURE - route encounter to clinic provider for recommendation.      If NO - indicate reason and close encounter:  Pt currently admitted

## 2023-12-18 NOTE — PLAN OF CARE
Assumed care of pt at approximately 1930. Alert, oriented x4. On RA. NSR on tele. SBP maintained 110-190. Neuro assessment stable. Has left sided weakness, denies numbness/tingling to left side. Plan for MRI brain, PMR eval.   Plan of care reviewed with pt and daughter. Problem: Patient/Family Goals  Goal: Patient/Family Long Term Goal  Description: Patient's Long Term Goal: regain strength to left leg    Interventions:  - dc to acute rehab  - PT/OT  - See additional Care Plan goals for specific interventions  12/18/2023 0131 by Glen Lundy RN  Outcome: Progressing  12/18/2023 0127 by Glen Lundy RN  Outcome: Progressing  Goal: Patient/Family Short Term Goal  Description: Patient's Short Term Goal: dc to rehab    Interventions:   - PT/OT  - PMR eval  - dc to acute rehab  - See additional Care Plan goals for specific interventions  12/18/2023 0131 by Glen Lundy RN  Outcome: Progressing  12/18/2023 0127 by Glen Lundy RN  Outcome: Progressing     Problem: CARDIOVASCULAR - ADULT  Goal: Maintains optimal cardiac output and hemodynamic stability  Description: INTERVENTIONS:  - Monitor vital signs, rhythm, and trends  - Monitor for bleeding, hypotension and signs of decreased cardiac output  - Evaluate effectiveness of vasoactive medications to optimize hemodynamic stability  - Monitor arterial and/or venous puncture sites for bleeding and/or hematoma  - Assess quality of pulses, skin color and temperature  - Assess for signs of decreased coronary artery perfusion - ex.  Angina  - Evaluate fluid balance, assess for edema, trend weights  12/18/2023 0131 by Glen Lundy RN  Outcome: Progressing  12/18/2023 0127 by Glen Lundy RN  Outcome: Progressing     Problem: NEUROLOGICAL - ADULT  Goal: Achieves stable or improved neurological status  Description: INTERVENTIONS  - Assess for and report changes in neurological status  - Initiate measures to prevent increased intracranial pressure  - Maintain blood pressure and fluid volume within ordered parameters to optimize cerebral perfusion and minimize risk of hemorrhage  - Monitor temperature, glucose, and sodium.  Initiate appropriate interventions as ordered  12/18/2023 0131 by William Malcolm RN  Outcome: Progressing  12/18/2023 0127 by William Malcolm, RN  Outcome: Progressing

## 2023-12-18 NOTE — PLAN OF CARE
Assumed care at 0730  Alert and oriented x4  RA. NSR on tele. VSS  Denies pain  Neuro checks done. Slight drift on left side   Numbness/tingling intermittent today  Up 2 w/ gait belt.  Pt unsteady, but ambulation improving  Voiding in bathroom and urinal  MRI + acute stroke  Pt/family updated on POC  Safety precautions in place

## 2023-12-19 PROCEDURE — 99232 SBSQ HOSP IP/OBS MODERATE 35: CPT | Performed by: HOSPITALIST

## 2023-12-19 RX ORDER — FUROSEMIDE 20 MG/1
20 TABLET ORAL DAILY
Status: DISCONTINUED | OUTPATIENT
Start: 2023-12-19 | End: 2023-12-20

## 2023-12-19 NOTE — CM/SW NOTE
12/19/23 1000   CM/SW Referral Data   Reason for Referral Discharge planning   Discharge Needs   Anticipated D/C needs Acute rehab   Services Requested   PMR Consult Requested Consult ordered     Discussed with RN, NP. Approved by PMR for AR. Spoke with dtr, would prefer AR near [facility]. Agreeable for [facility], notified liaison.  Will need insurance approval.     Sabas Olsen, [facility]  Discharge 2011 [facility]

## 2023-12-19 NOTE — PHYSICAL THERAPY NOTE
PHYSICAL THERAPY TREATMENT NOTE - INPATIENT    Room Number: 5257/8704-E     Session: 1     Number of Visits to Meet Established Goals: 4    Presenting Problem: L side weakness and numnbess, hypertensive urgency  Co-Morbidities : HTN  History related to current admission: Patient is a 66year old male admitted on 2023 from home for L sided weakness and numbness. Pt diagnosed with hypertensive urgency. SPB in . Also with possible TIA. MRI brain negative for acute event per imaging report. ASSESSMENT   Pt was observed to have improve transfers to perform sit to  the chair from Mod a to min A. Pt was able to ambulate with Min A 40ft but still demonstrates poor motor planning with inconsistent step length on the LLE during ambulation. Pt will continue to benefit with intense physical therapy to improve performance for gait, mobility and transfers. DISCHARGE RECOMMENDATIONS  PT Discharge Recommendations: Acute rehabilitation     PLAN  PT Treatment Plan: Bed mobility; Body mechanics; Endurance; Energy conservation;Patient education;Gait training;Neuromuscular re-educate;Strengthening;Transfer training;Balance training  Rehab Potential : Good  Frequency (Obs): 3-5x/week    CURRENT GOALS     Goal #1 Patient is able to demonstrate supine - sit EOB @ level: supervision      Goal #2 Patient is able to demonstrate transfers EOB to/from Chair/Wheelchair at assistance level: supervision      Goal #3 Patient is able to ambulate 100 feet with assist device: walker - rolling at assistance level: supervision      Goal #4     Goal #5     Goal #6     Goal Comments: Goals established on 2023 all goals ongoing      SUBJECTIVE  \"I feel ok today\"    OBJECTIVE  Precautions: Bed/chair alarm (-190)    WEIGHT BEARING RESTRICTION  Weight Bearing Restriction: None                PAIN ASSESSMENT   Ratin  Location: Pt reported no pain       BALANCE Static Sitting: Good  Dynamic Sitting: Good           Static Standing: Fair -  Dynamic Standing: Fair -    ACTIVITY TOLERANCE                         O2 WALK         AM-PAC '6-Clicks' INPATIENT SHORT FORM - BASIC MOBILITY  How much difficulty does the patient currently have. .. Patient Difficulty: Turning over in bed (including adjusting bedclothes, sheets and blankets)?: A Little   Patient Difficulty: Sitting down on and standing up from a chair with arms (e.g., wheelchair, bedside commode, etc.): A Little   Patient Difficulty: Moving from lying on back to sitting on the side of the bed?: A Little   How much help from another person does the patient currently need. .. Help from Another: Moving to and from a bed to a chair (including a wheelchair)?: A Little   Help from Another: Need to walk in hospital room?: A Lot   Help from Another: Climbing 3-5 steps with a railing?: A Lot       AM-PAC Score:  Raw Score: 16   Approx Degree of Impairment: 54.16%   Standardized Score (AM-PAC Scale): 40.78   CMS Modifier (G-Code): CK    FUNCTIONAL ABILITY STATUS  Gait Assessment   Functional Mobility/Gait Assessment  Gait Assistance: Contact guard assist  Distance (ft): 40  Assistive Device: Rolling walker  Pattern: L Flexed knee;L Foot drag;Shuffle    Skilled Therapy Provided    Bed Mobility:  Rolling: NT   Supine<>Sit: Min A   Sit<>Supine: NT     Transfer Mobility:  Sit<>Stand: Min A   Stand<>Sit: Min A   Gait: Min A 40ft with the use of the RW with W/C follow    Therapist's Comments: During gait pt still required min A to improve consistency of the step length during swing phase. Due to the inconsistent step length pt had moments of gait instability and required a W/C follow. Pt perform stand pivot transfer with Min A with verbal and tactile cues for improve sequencing to safely perform transfer.         Patient End of Session: Up in chair;Needs met;Call light within reach;RN aware of session/findings; All patient questions and concerns addressed; Alarm set    PT Session Time: 23 minutes  Gait Training: 15 minutes  Therapeutic Activity: 8 minutes

## 2023-12-19 NOTE — PLAN OF CARE
Assumed care at 0730  A&OX4, VSS, up with 1 walker  No complaints of pain   No change in neuro status   PT/OT eval complete  Patient and family updated on POC   All questions answered   Call light within reach

## 2023-12-19 NOTE — PLAN OF CARE
Assumed care of pt at approximately 1930. Alert, oriented x4. On RA. NSR on tele. Having intermittent left facial numbness. Strength to left leg slightly improved. Denies any pain. Voiding per urinal.  Discharge planning to acute rehab. Plan of care reviewed with pt. Problem: Patient/Family Goals  Goal: Patient/Family Long Term Goal  Description: Patient's Long Term Goal: regain strength to left leg    Interventions:  - dc to acute rehab  - PT/OT  - See additional Care Plan goals for specific interventions  Outcome: Progressing  Goal: Patient/Family Short Term Goal  Description: Patient's Short Term Goal: dc to rehab    Interventions:   - PT/OT  - PMR eval  - dc to acute rehab  - See additional Care Plan goals for specific interventions  Outcome: Progressing     Problem: CARDIOVASCULAR - ADULT  Goal: Maintains optimal cardiac output and hemodynamic stability  Description: INTERVENTIONS:  - Monitor vital signs, rhythm, and trends  - Monitor for bleeding, hypotension and signs of decreased cardiac output  - Evaluate effectiveness of vasoactive medications to optimize hemodynamic stability  - Monitor arterial and/or venous puncture sites for bleeding and/or hematoma  - Assess quality of pulses, skin color and temperature  - Assess for signs of decreased coronary artery perfusion - ex. Angina  - Evaluate fluid balance, assess for edema, trend weights  Outcome: Progressing     Problem: NEUROLOGICAL - ADULT  Goal: Achieves stable or improved neurological status  Description: INTERVENTIONS  - Assess for and report changes in neurological status  - Initiate measures to prevent increased intracranial pressure  - Maintain blood pressure and fluid volume within ordered parameters to optimize cerebral perfusion and minimize risk of hemorrhage  - Monitor temperature, glucose, and sodium.  Initiate appropriate interventions as ordered  Outcome: Progressing

## 2023-12-20 VITALS
DIASTOLIC BLOOD PRESSURE: 61 MMHG | SYSTOLIC BLOOD PRESSURE: 151 MMHG | BODY MASS INDEX: 31.99 KG/M2 | HEART RATE: 72 BPM | RESPIRATION RATE: 18 BRPM | HEIGHT: 61.42 IN | TEMPERATURE: 98 F | WEIGHT: 171.63 LBS | OXYGEN SATURATION: 98 %

## 2023-12-20 LAB
ANION GAP SERPL CALC-SCNC: 6 MMOL/L (ref 0–18)
BASOPHILS # BLD AUTO: 0.03 X10(3) UL (ref 0–0.2)
BASOPHILS NFR BLD AUTO: 0.4 %
BUN BLD-MCNC: 15 MG/DL (ref 9–23)
CALCIUM BLD-MCNC: 8.7 MG/DL (ref 8.5–10.1)
CHLORIDE SERPL-SCNC: 106 MMOL/L (ref 98–112)
CO2 SERPL-SCNC: 27 MMOL/L (ref 21–32)
CREAT BLD-MCNC: 1.09 MG/DL
EGFRCR SERPLBLD CKD-EPI 2021: 69 ML/MIN/1.73M2 (ref 60–?)
EOSINOPHIL # BLD AUTO: 0.15 X10(3) UL (ref 0–0.7)
EOSINOPHIL NFR BLD AUTO: 1.9 %
ERYTHROCYTE [DISTWIDTH] IN BLOOD BY AUTOMATED COUNT: 12 %
GLUCOSE BLD-MCNC: 131 MG/DL (ref 70–99)
HCT VFR BLD AUTO: 50.1 %
HGB BLD-MCNC: 16.2 G/DL
IMM GRANULOCYTES # BLD AUTO: 0.08 X10(3) UL (ref 0–1)
IMM GRANULOCYTES NFR BLD: 1 %
LYMPHOCYTES # BLD AUTO: 2.37 X10(3) UL (ref 1–4)
LYMPHOCYTES NFR BLD AUTO: 30.3 %
MCH RBC QN AUTO: 29.1 PG (ref 26–34)
MCHC RBC AUTO-ENTMCNC: 32.3 G/DL (ref 31–37)
MCV RBC AUTO: 90.1 FL
MONOCYTES # BLD AUTO: 0.78 X10(3) UL (ref 0.1–1)
MONOCYTES NFR BLD AUTO: 10 %
NEUTROPHILS # BLD AUTO: 4.42 X10 (3) UL (ref 1.5–7.7)
NEUTROPHILS # BLD AUTO: 4.42 X10(3) UL (ref 1.5–7.7)
NEUTROPHILS NFR BLD AUTO: 56.4 %
OSMOLALITY SERPL CALC.SUM OF ELEC: 291 MOSM/KG (ref 275–295)
PLATELET # BLD AUTO: 208 10(3)UL (ref 150–450)
POTASSIUM SERPL-SCNC: 3.4 MMOL/L (ref 3.5–5.1)
POTASSIUM SERPL-SCNC: 3.9 MMOL/L (ref 3.5–5.1)
RBC # BLD AUTO: 5.56 X10(6)UL
SODIUM SERPL-SCNC: 139 MMOL/L (ref 136–145)
WBC # BLD AUTO: 7.8 X10(3) UL (ref 4–11)

## 2023-12-20 PROCEDURE — 99232 SBSQ HOSP IP/OBS MODERATE 35: CPT | Performed by: HOSPITALIST

## 2023-12-20 RX ORDER — POTASSIUM CHLORIDE 1.5 G/1.58G
40 POWDER, FOR SOLUTION ORAL EVERY 4 HOURS
Status: DISPENSED | OUTPATIENT
Start: 2023-12-20 | End: 2023-12-20

## 2023-12-20 RX ORDER — HYDRALAZINE HYDROCHLORIDE 25 MG/1
25 TABLET, FILM COATED ORAL EVERY 8 HOURS SCHEDULED
Status: DISCONTINUED | OUTPATIENT
Start: 2023-12-20 | End: 2023-12-20

## 2023-12-20 RX ORDER — HYDRALAZINE HYDROCHLORIDE 25 MG/1
25 TABLET, FILM COATED ORAL EVERY 8 HOURS SCHEDULED
Qty: 90 TABLET | Refills: 0 | Status: SHIPPED | OUTPATIENT
Start: 2023-12-20 | End: 2024-01-19

## 2023-12-20 NOTE — CM/SW NOTE
12/20/23 1525   Discharge disposition   Expected discharge disposition Rehab [address] Provider [facility]   Discharge transportation [address] approved for . Bed available, CBC/BMP needed. [facility] transport scheduled for 6pm, PCS completed     Pt to dc to rm 1172, report is [phone]. Should anything change, please call Jenifer Porras at [phone] to adjust time.      Romina Barajas, ARTHUR  Discharge 2011 [facility]

## 2023-12-20 NOTE — PLAN OF CARE
Assumed care at 0730  A&Ox4, VSS, up with 1 and walker   No complaints of pain   No change in neuro status   Patient and family updated on POC   Report called to [address] [place] [address]- verbalized understanding, all questions answered       NURSING DISCHARGE NOTE    Discharged Rehab facility via Ambulance. Accompanied by Support staff  Belongings Taken by patient/family.

## 2023-12-20 NOTE — PLAN OF CARE
Patient A&O x4, no c/o pain, room air. Patient able to move all extremities but weaker to left side. Patient used walker to get to the bathroom this evening. Moves with slight limp to his left leg but was able to balance well.

## 2023-12-21 ENCOUNTER — PATIENT OUTREACH (OUTPATIENT)
Dept: CASE MANAGEMENT | Age: 78
End: 2023-12-21

## 2023-12-21 NOTE — PROGRESS NOTES
[facility] / Neuro/Stroke apt request (discharged 12/20) - pt discharged to [facility] - pt discharged to [facility] [address]  [place], [address]  [phone]  Wed 02/07/24 @10:45am  Confirmed w/pt daughter,  Isai Scales encounter

## 2024-01-07 ENCOUNTER — TELEPHONE (OUTPATIENT)
Dept: MEDSURG UNIT | Facility: HOSPITAL | Age: 79
End: 2024-01-07

## 2024-01-07 NOTE — PROGRESS NOTES
7 DAYS STROKE FOLLOW-UP DATA COLLECTION    Patient Name: Torin Cam Date: [birth year]   : 1945 Gender: male   Date of hospital admission: 2023 Date of hospital discharge: 2023   Stroke: Ischemic Discharge disposition: Acute rehab     Called to obtain 7 day follow-up. No answer. Left voicemail and instructed patient to call the non-emergent stroke line with any stroke related questions/concerns.

## 2024-02-07 ENCOUNTER — OFFICE VISIT (OUTPATIENT)
Dept: NEUROLOGY | Facility: CLINIC | Age: 79
End: 2024-02-07
Payer: MEDICARE

## 2024-02-07 VITALS — DIASTOLIC BLOOD PRESSURE: 78 MMHG | SYSTOLIC BLOOD PRESSURE: 138 MMHG | HEART RATE: 74 BPM

## 2024-02-07 DIAGNOSIS — Z79.899 MEDICATION MANAGEMENT: ICD-10-CM

## 2024-02-07 DIAGNOSIS — I63.81 RIGHT THALAMIC STROKE (HCC): Primary | ICD-10-CM

## 2024-02-07 PROCEDURE — 1159F MED LIST DOCD IN RCRD: CPT | Performed by: STUDENT IN AN ORGANIZED HEALTH CARE EDUCATION/TRAINING PROGRAM

## 2024-02-07 PROCEDURE — 1160F RVW MEDS BY RX/DR IN RCRD: CPT | Performed by: STUDENT IN AN ORGANIZED HEALTH CARE EDUCATION/TRAINING PROGRAM

## 2024-02-07 PROCEDURE — 99215 OFFICE O/P EST HI 40 MIN: CPT | Performed by: STUDENT IN AN ORGANIZED HEALTH CARE EDUCATION/TRAINING PROGRAM

## 2024-02-07 PROCEDURE — 3075F SYST BP GE 130 - 139MM HG: CPT | Performed by: STUDENT IN AN ORGANIZED HEALTH CARE EDUCATION/TRAINING PROGRAM

## 2024-02-07 PROCEDURE — 3078F DIAST BP <80 MM HG: CPT | Performed by: STUDENT IN AN ORGANIZED HEALTH CARE EDUCATION/TRAINING PROGRAM

## 2024-02-07 RX ORDER — CARVEDILOL 25 MG/1
25 TABLET ORAL 2 TIMES DAILY
COMMUNITY
Start: 2024-02-05

## 2024-02-07 RX ORDER — HYDRALAZINE HYDROCHLORIDE 25 MG/1
TABLET, FILM COATED ORAL
COMMUNITY

## 2024-02-07 RX ORDER — ATORVASTATIN CALCIUM 40 MG/1
40 TABLET, FILM COATED ORAL NIGHTLY
Qty: 90 TABLET | Refills: 1 | Status: SHIPPED | OUTPATIENT
Start: 2024-02-07

## 2024-02-07 RX ORDER — ERGOCALCIFEROL 1.25 MG/1
1 CAPSULE ORAL WEEKLY
COMMUNITY
Start: 2021-04-22

## 2024-02-07 NOTE — PATIENT INSTRUCTIONS
-Follow up with PCP regarding right perihilar lymph node and elevated pulmonary artery pressure and blood pressure management  --Stroke risk reduction:    -Cholesterol: goal LDL < 70 mg/dL, high intensity statin continue atorvastatin 40 mg daily   -Blood pressure: goal < 130/80 mm Hg   -Healthy diet (produce rich, DASH diet, Mediterranean diet)   -Regular exercise (Moderate to vigorous-intensity aerobic exercise for at least 30 minutes a day, 3 to 4 times a week)   -Antithrombotic: continue aspirin 81 mg daily indefinitely, stop clopidogrel/plavix 75 mg daily   -Pre-Diabetes: work on good glucose control  -Will send Dr. Talamantes my note, PCP    Refill policies:    Allow 2-3 business days for refills; controlled substances may take longer.  Contact your pharmacy at least 5 days prior to running out of medication and have them send an electronic request or submit request through the “request refill” option in your [employer] account.  Refills are not addressed on weekends; covering physicians do not authorize routine medications on weekends.  No narcotics or controlled substances are refilled after noon on Fridays or by on call physicians.  By law, narcotics must be electronically prescribed.  A 30 day supply with no refills is the maximum allowed.  If your prescription is due for a refill, you may be due for a follow up appointment.  To best provide you care, patients receiving routine medications need to be seen at least once a year.  Patients receiving narcotic/controlled substance medications need to be seen at least once every 3 months.  In the event that your preferred pharmacy does not have the requested medication in stock (e.g. Backordered), it is your responsibility to find another pharmacy that has the requested medication available.  We will gladly send a new prescription to that pharmacy at your request.    Scheduling Tests:    If your physician has ordered radiology tests such as MRI or CT scans, please  contact Central Scheduling at [phone] right away to schedule the test.  Once scheduled, the [facility] Centralized Referral Team will work with your insurance carrier to obtain pre-certification or prior authorization.  Depending on your insurance carrier, approval may take 3-10 days.  It is highly recommended patients assure they have received an authorization before having a test performed.  If test is done without insurance authorization, patient may be responsible for the entire amount billed.      Precertification and Prior Authorizations:  If your physician has recommended that you have a procedure or additional testing performed the [facility] Centralized Referral Team will contact your insurance carrier to obtain pre-certification or prior authorization.    You are strongly encouraged to contact your insurance carrier to verify that your procedure/test has been approved and is a COVERED benefit.  Although the [facility] Centralized Referral Team does its due diligence, the insurance carrier gives the disclaimer that \"Although the procedure is authorized, this does not guarantee payment.\"    Ultimately the patient is responsible for payment.   Thank you for your understanding in this matter.  Paperwork Completion:  If you require FMLA or disability paperwork for your recovery, please make sure to either drop it off or have it faxed to our office at [phone]. Be sure the form has your name and date of birth on it.  The form will be faxed to our Forms Department and they will complete it for you.  There is a 25$ fee for all forms that need to be filled out.  Please be aware there is a 10-14 day turnaround time.  You will need to sign a release of information (MARTELL) form if your paperwork does not come with one.  You may call the Forms Department with any questions at [phone].  Their fax number is [phone].

## 2024-02-07 NOTE — H&P
Neurology New Office Visit    Torin Cam   Date of Birth 7/23/1945    Subjective:  Torin Cam is a(n) 78 year old male with a medical history of hypertension who presents for stroke follow up.     Patient reports developed left sided weakness and numbness in mouth. Son reports, initially didn't have stroke and then had stroke in hospital when symptoms worsened. He reports currently feels mouth still a bit numb. Left leg is a bit weak. Feels tips orf fingers are a bit weak. Still doing physical therapy. Still using walker, though pt told he may not need, for added security. Prior to hospitalization, he hadn't taken any medications for two days prior to admission (had missed a few days of BP meds). Not on daily antithrombotic prior to admission. Hard to say if mouth or lips are getting better, waxes and wanes. Feels well rested in AM, does not snore.       Problem List:  Patient Active Problem List   Diagnosis    Hypokalemia    Hyperglycemia    Hypertensive urgency    TIA (transient ischemic attack)    Acute ischemic VBA thalamic stroke, right (HCC)       PMHx:  Past Medical History:   Diagnosis Date    Essential hypertension     High blood pressure     Stroke (HCC)        PSHx:  Past Surgical History:   Procedure Laterality Date    REPAIR ING HERNIA,5+Y/O,REDUCIBL         SocHx:  Social History     Socioeconomic History    Marital status:    Tobacco Use    Smoking status: Never    Smokeless tobacco: Never   Vaping Use    Vaping Use: Never used   Substance and Sexual Activity    Alcohol use: Yes     Comment: Socially    Drug use: Never     Social Determinants of Health     Food Insecurity: No Food Insecurity (12/16/2023)    Food Insecurity     Food Insecurity: Never true   Transportation Needs: No Transportation Needs (12/16/2023)    Transportation Needs     Lack of Transportation: No   Housing Stability: Low Risk  (12/16/2023)    Housing Stability     Housing Instability: No   No tobacco. Drinks alcohol  socially.     Family History:  History reviewed. No pertinent family history.    Current Medications:  Current Outpatient Medications   Medication Sig Dispense Refill    carvedilol 25 MG Oral Tab Take 1 tablet (25 mg total) by mouth 2 (two) times daily.      cyanocobalamin ([employer] VITAMIN B-12) 1000 MCG Oral Tab Take 1 tablet (1,000 mcg total) by mouth daily.      ergocalciferol 1.25 MG (10481 UT) Oral Cap Take 1 capsule (50,000 Units total) by mouth once a week.      hydrALAZINE 25 MG Oral Tab TAKE 1 TAB BY MOUTH 3 TIMES DAILY. HOLD SYSTOLIC BP IS LESS THAN 110 INDICATION HIGH BLOOD PRESSURE      atorvastatin 40 MG Oral Tab Take 1 tablet (40 mg total) by mouth nightly. 90 tablet 1    aspirin 81 MG Oral Chew Tab Chew 1 tablet (81 mg total) by mouth daily. 30 tablet 1    losartan 25 MG Oral Tab Take 2 tablets (50 mg total) by mouth daily.      furosemide 40 MG Oral Tab Take 1 tablet (40 mg total) by mouth daily.      metoprolol succinate ER 50 MG Oral Tablet 24 Hr Take 1 tablet (50 mg total) by mouth daily. (Patient not taking: Reported on 2/7/2024)      magnesium 30 MG Oral Tab Take 1 tablet (30 mg total) by mouth 2 (two) times daily. (Patient not taking: Reported on 2/7/2024)         ROS:  No recent big changes in weight, blood in stool, fevers    Objective/Physical Exam:    Vital Signs:  Blood pressure 138/78, pulse 74.  General: Alert, good recall of personal medical history    Respiratory: Non labored breathing on room air    Cardiovascular: Regular rate    Mental status: Alert, oriented, language fluent, comprehension intact    Cranial nerves: Optic disc margins sharp VF full to confrontation bilaterally. Pupils equal, round, equally reactive to light and accommodation. Extraocular eye movements intact without nystagmus. Face sensation intact to light touch. Face strength symmetric and intact. No dysarthria.    Motor:   Power:   -Right upper extremity: shoulder abductors 5, elbow extensors 5, elbow flexors 5,  wrist extensors 5, finger extension 5  -Left upper extremity: shoulder abductors 5, elbow extensors 5, elbow flexors 5, wrist extensors 5, finger extension 5  -Right lower extremity: hip flexion 5, knee extension 5, dorsiflexion 5  -Left lower extremity: hip flexion 5, knee extension 5, dorsiflexion 5  Tone: Normal   Bulk: Normal  Abnormal movements: None    Sensory: Intact to light touch and vibration>20s in distal extremities.    Coordination: Finger to nose and heel to shin intact.    Reflexes: Right/Left: Biceps 2/2, triceps 2/2, brachioradialis 2/2, hoffmans negative. Patella 2/2, achilles 2/2.    Gait: Narrow based, symmetric arm swing, no gait assist. Presented with walker.     Labs:     Component      Latest Ref Rng 12/16/2023 12/17/2023   WBC      4.0 - 11.0 x10(3) uL 5.8     RBC      3.80 - 5.80 x10(6)uL 5.62     Hemoglobin      13.0 - 17.5 g/dL 16.2     Hematocrit      39.0 - 53.0 % 50.7     Platelet Count      150.0 - 450.0 10(3)uL 203.0     MCV      80.0 - 100.0 fL 90.2     MCH      26.0 - 34.0 pg 28.8     MCHC      31.0 - 37.0 g/dL 32.0     RDW      % 11.8     Prelim Neutrophil Abs      1.50 - 7.70 x10 (3) uL 2.79     Neutrophils Absolute      1.50 - 7.70 x10(3) uL 2.79     Lymphocytes Absolute      1.00 - 4.00 x10(3) uL 2.18     Monocytes Absolute      0.10 - 1.00 x10(3) uL 0.64     Eosinophils Absolute      0.00 - 0.70 x10(3) uL 0.11     Basophils Absolute      0.00 - 0.20 x10(3) uL 0.02     Immature Granulocyte Absolute      0.00 - 1.00 x10(3) uL 0.03     Neutrophils %      % 48.4     Lymphocytes %      % 37.8     Monocytes %      % 11.1     Eosinophils %      % 1.9     Basophils %      % 0.3     Immature Granulocyte %      % 0.5     Glucose      70 - 99 mg/dL 131 (H)     Sodium      136 - 145 mmol/L 139     Potassium      3.5 - 5.1 mmol/L 3.5     Chloride      98 - 112 mmol/L 105     Carbon Dioxide, Total      21.0 - 32.0 mmol/L 30.0     ANION GAP      0 - 18 mmol/L 4     BUN      9 - 23 mg/dL 13      CREATININE      0.70 - 1.30 mg/dL 1.17     CALCIUM      8.5 - 10.1 mg/dL 8.5     CALCULATED OSMOLALITY      275 - 295 mOsm/kg 290     EGFR      >=60 mL/min/1.73m2 64     AST (SGOT)      15 - 37 U/L 26     ALT (SGPT)      16 - 61 U/L 27     ALKALINE PHOSPHATASE      45 - 117 U/L 115     Total Bilirubin      0.1 - 2.0 mg/dL 0.6     PROTEIN, TOTAL      6.4 - 8.2 g/dL 7.6     Albumin      3.4 - 5.0 g/dL 3.7     Globulin      2.8 - 4.4 g/dL 3.9     A/G Ratio      1.0 - 2.0  0.9 (L)     Cholesterol, Total      <200 mg/dL  125    HDL Cholesterol      40 - 59 mg/dL  47    Triglycerides      30 - 149 mg/dL  119    LDL Cholesterol Calc      <100 mg/dL  57    VLDL      0 - 30 mg/dL  17    NON-HDL CHOLESTEROL      <130 mg/dL  78    PT      11.6 - 14.8 seconds 14.4     INR      0.80 - 1.20  1.12     HEMOGLOBIN A1c      <5.7 %  6.1 (H)    ESTIMATED AVERAGE GLUCOSE      68 - 126 mg/dL  128 (H)    Troponin I (High Sensitivity)      <=79 ng/L 11     APTT      23.3 - 35.6 seconds 34.2        Legend:  (H) High  (L) Low    Imaging:  CTA head neck 12/2023  FINDINGS:    Ventricles and sulci are within normal limits.  There is no midline shift or mass-effect.  The basal cisterns are patent.  The gray-white matter differentiation is intact.     There is no acute intracranial hemorrhage or extra-axial fluid collection.  Lucencies throughout the white matter are noted.     There is no evident fracture.  Mild to moderate mucoperiosteal thickening of the paranasal sinuses.     The upper cervical, petrous, cavernous, and supraclinoid internal carotid arteries are unremarkable.  An anterior communicating artery is seen.  The branches of the anterior cerebral and middle cerebral arteries are unremarkable.  A small right posterior   communicating artery is seen along with a small left posterior communicating artery.  The branches of the posterior cerebral and superior cerebellar arteries are unremarkable.  The basilar artery has a normal course  and caliber.  The bilateral vertebral   arteries are unremarkable.  Takeoff of the left PICA is noted     There is a 3-vessel aortic arch.  The origins of the branch vessels appear widely patent.  The bilateral subclavian arteries and innominate artery are unremarkable.     Calcified plaques at the bifurcation of bilateral common carotid arteries is noted.  No significant stenosis.  There is no evidence of hemodynamically significant stenosis in the carotid bulbs by NASCET criteria.  The cervical internal carotid arteries  are widely patent.     The vertebral arteries originate from the subclavian arteries.  The origins of the vertebral arteries are patent.  The cervical vertebral arteries are widely patent.  The left vertebral artery is mildly dominant.                      Impression   CONCLUSION:       1. Sequelae of moderate chronic small vessel ischemic disease.  No evidence of hemorrhage or extra-axial fluid collection.     2. Prominent right perihilar lymph node measures 2.6 x 2.1 cm.  Interstitial abnormalities in the lungs is noted.     3. No significant stenosis or aneurysmal dilatation involving the major arterial structures in the head or neck      MRI brain MRA brain   MRI BRAIN  FINDINGS:       Mild prominence of the sulci.       There is no midline shift or mass effect.       The basal cisterns are patent.       The craniocervical junction is unremarkable.       Midline structures including corpus callosum, optic chiasm and cerebellar tonsils are overall unremarkable.     There is no acute intracranial hemorrhage or extra-axial fluid collection identified.       Marked FLAIR abnormalities the white matter.       Susceptibility in the left parasagittal frontal lobe along with the right parasagittal parietal lobe is noted.  Susceptibility in the region of the right dentate nucleus is noted.     There is no restricted diffusion to suggest acute ischemia/infarction.     The visualized paranasal sinuses  and mastoid air cells are unremarkable.       The expected major intracranial flow voids are present.     MRA BRAIN  The upper cervical, petrous, cavernous, and supraclinoid internal carotid arteries are unremarkable.       An anterior communicating artery is seen.       The branches of the anterior cerebral and middle cerebral arteries are unremarkable.       Marked severe stenosis in the proximal left P2 segment is noted.  Left posterior cerebral artery distal to this is patent.     The basilar artery has a normal course and caliber.       The bilateral vertebral arteries are unremarkable.  The origins of the bilateral PICA are seen.                     Impression   CONCLUSION:       1. No evidence of an acute infarct.     2. Sequelae of marked severe chronic small vessel ischemic disease.     3. Mild global parenchymal volume loss.     4. Multiple foci of gradient susceptibility in the brain parenchyma may be sequelae of chronic hemorrhagic products.     5. 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  The left posterior cerebral artery distal to this is widely patent.     MR Brain DWI   FINDINGS:    There is an approximately 1.3 cm focus of restricted diffusion in the right thalamus compatible with acute infarct.  This is new in comparison to 12/16/2023.                   Impression   CONCLUSION:  Interval development of 1.3 cm focus of restricted diffusion in the right thalamus compatible with acute infarct, which is new from 12/16/2023.       TTE 12/17/23  Conclusions:     1. Left ventricle: The cavity size was normal. Wall thickness was at the      upper limits of normal. Systolic function was normal. The estimated      ejection fraction was 60-65%. Left ventricular diastolic function      parameters were normal for the patient's age.   2. Right ventricle: The cavity size was normal. Systolic function was      normal.   3. Left atrium: The left atrial volume was normal.    4. Atrial septum: Agitated saline contrast study showed no atrial level      shunt, at baseline or with provocation.   5. Tricuspid valve: There was mild regurgitation.   6. Pulmonary arteries: Systolic pressure was moderately increased, estimated      to be 55mm Hg. Estimated pulmonary artery diastolic pressure was 7mm Hg.   7. Pericardium, extracardiac: There was no pericardial effusion.   Impressions:  No previous study from [facility] was   available for comparison. This study is compared with previous dated   3/9/2019: Improvement in pericardial effusion previously noted. Otherwise,   findings are comparable.     Assessment:  Torin Cam is a(n) 78 year old male with a medical history of hypertension who presents for stroke follow up. Presented with left sided weakness and left facial numbness (symptoms waxed and waned 12/2023). He reports missing antihypertensives for two days prior to admission, which is atypical for him. Initial MRI brain with marked chronic flair white matter changes and mild volume loss and SWI changes. Had recurrence of symptoms, repeat MRI brain with right thalamic ischemic stroke. CTA and MRA head/neck with some extracranial atherosclerotic disease and intracranial atherosclerotic disease (ayesha marked stenosis left PCA).  TTE with borderline high wall thickness LV, normal LA volume, no shunt, elevated pulmonary artery systolic pressure. A1C 6.1, LDL 57. Started on dual antiplatelet therapy with aspirin and plavix (not on antithrombotic prior to admission). Stroke mechanism favored to be small vessel/lacunar given location and morphology. Will de-escalate to aspirin monotherapy given suspected stroke mechanism, further risk reduction as below.       Plan:  -Follow up with PCP regarding right perihilar lymph node and elevated pulmonary artery pressure and blood pressure management  --Stroke risk reduction:    -Cholesterol: goal LDL < 70 mg/dL, high intensity statin  continue atorvastatin 40 mg daily   -Blood pressure: goal < 130/80 mm Hg   -Healthy diet (produce rich, DASH diet, Mediterranean diet)   -Regular exercise (Moderate to vigorous-intensity aerobic exercise for at least 30 minutes a day, 3 to 4 times a week)   -Antithrombotic: continue aspirin 81 mg daily indefinitely, stop clopidogrel 75 mg daily   -Pre-Diabetes: work on good glucose control  -Will send my note to PCP, Dr. Talamantes        1. Right thalamic stroke (HCC)  - atorvastatin 40 MG Oral Tab; Take 1 tablet (40 mg total) by mouth nightly.  Dispense: 90 tablet; Refill: 1    2. Medication management  - atorvastatin 40 MG Oral Tab; Take 1 tablet (40 mg total) by mouth nightly.  Dispense: 90 tablet; Refill: 1    Total time 48 minutes including chart review, eliciting history, physical exam, and counseling.    Jud Guo, DO

## 2024-04-01 ENCOUNTER — TELEPHONE (OUTPATIENT)
Dept: FAMILY MEDICINE | Age: 79
End: 2024-04-01

## 2024-04-10 ENCOUNTER — OFFICE VISIT (OUTPATIENT)
Dept: NEUROLOGY | Facility: CLINIC | Age: 79
End: 2024-04-10
Payer: MEDICARE

## 2024-04-10 ENCOUNTER — TELEPHONE (OUTPATIENT)
Dept: NEUROLOGY | Facility: CLINIC | Age: 79
End: 2024-04-10

## 2024-04-10 VITALS
RESPIRATION RATE: 16 BRPM | BODY MASS INDEX: 32.47 KG/M2 | OXYGEN SATURATION: 98 % | DIASTOLIC BLOOD PRESSURE: 88 MMHG | WEIGHT: 172 LBS | HEART RATE: 70 BPM | HEIGHT: 61 IN | SYSTOLIC BLOOD PRESSURE: 160 MMHG

## 2024-04-10 DIAGNOSIS — I63.81 RIGHT THALAMIC STROKE (HCC): Primary | ICD-10-CM

## 2024-04-10 DIAGNOSIS — Z79.899 MEDICATION MANAGEMENT: ICD-10-CM

## 2024-04-10 RX ORDER — SENNOSIDES A AND B 8.6 MG/1
2 TABLET, FILM COATED ORAL
COMMUNITY
Start: 2023-12-29

## 2024-04-10 RX ORDER — LATANOPROST 50 UG/ML
1 SOLUTION/ DROPS OPHTHALMIC
COMMUNITY
Start: 2024-04-01

## 2024-04-10 RX ORDER — LOSARTAN POTASSIUM 100 MG/1
TABLET ORAL
COMMUNITY
Start: 2024-01-23

## 2024-04-10 NOTE — PATIENT INSTRUCTIONS
-Follow up with PCP regarding right perihilar lymph node and elevated pulmonary artery pressure (seen on tte) and blood pressure management  -Stroke risk reduction:    -Cholesterol: goal LDL < 70 mg/dL, high intensity statin continue atorvastatin 40 mg daily   -Blood pressure: goal < 130/80 mm Hg   -Healthy diet (produce rich, DASH diet, Mediterranean diet)    -Let me know if you want a nutrition referral   -Regular exercise (Moderate to vigorous-intensity aerobic exercise for at least 30 minutes a day, 3 to 4 times a week)   -Antithrombotic: continue aspirin 81 mg daily indefinitely, stop clopidogrel 75 mg daily   -Pre-Diabetes: work on good glucose control  -Will send my note to PCP, Dr. Talamantes      Refill policies:    Allow 2-3 business days for refills; controlled substances may take longer.  Contact your pharmacy at least 5 days prior to running out of medication and have them send an electronic request or submit request through the “request refill” option in your [employer] account.  Refills are not addressed on weekends; covering physicians do not authorize routine medications on weekends.  No narcotics or controlled substances are refilled after noon on Fridays or by on call physicians.  By law, narcotics must be electronically prescribed.  A 30 day supply with no refills is the maximum allowed.  If your prescription is due for a refill, you may be due for a follow up appointment.  To best provide you care, patients receiving routine medications need to be seen at least once a year.  Patients receiving narcotic/controlled substance medications need to be seen at least once every 3 months.  In the event that your preferred pharmacy does not have the requested medication in stock (e.g. Backordered), it is your responsibility to find another pharmacy that has the requested medication available.  We will gladly send a new prescription to that pharmacy at your request.    Scheduling Tests:    If your physician has  ordered radiology tests such as MRI or CT scans, please contact Central Scheduling at [phone] right away to schedule the test.  Once scheduled, the [facility] Centralized Referral Team will work with your insurance carrier to obtain pre-certification or prior authorization.  Depending on your insurance carrier, approval may take 3-10 days.  It is highly recommended patients assure they have received an authorization before having a test performed.  If test is done without insurance authorization, patient may be responsible for the entire amount billed.      Precertification and Prior Authorizations:  If your physician has recommended that you have a procedure or additional testing performed the [facility] Centralized Referral Team will contact your insurance carrier to obtain pre-certification or prior authorization.    You are strongly encouraged to contact your insurance carrier to verify that your procedure/test has been approved and is a COVERED benefit.  Although the [facility] Centralized Referral Team does its due diligence, the insurance carrier gives the disclaimer that \"Although the procedure is authorized, this does not guarantee payment.\"    Ultimately the patient is responsible for payment.   Thank you for your understanding in this matter.  Paperwork Completion:  If you require FMLA or disability paperwork for your recovery, please make sure to either drop it off or have it faxed to our office at [phone]. Be sure the form has your name and date of birth on it.  The form will be faxed to our Forms Department and they will complete it for you.  There is a 25$ fee for all forms that need to be filled out.  Please be aware there is a 10-14 day turnaround time.  You will need to sign a release of information (MARTELL) form if your paperwork does not come with one.  You may call the Forms Department with any questions at [phone].  Their fax number is [phone].

## 2024-04-10 NOTE — TELEPHONE ENCOUNTER
Faxed office visit per request to Dr. Talamantes. Fax confirmation received.     Jud Guo DO P [facility] [place] Nurse  [facility],  Can someone please fax my note to primary care provider (PCP)?  Thanks,  Jud

## 2024-04-10 NOTE — PROGRESS NOTES
Neurology Office Visit    Torin Cam   Date of Birth 7/23/1945    Interval history April 10, 2024:   -Found to have glaucoma  -PCP monitoring bp  Reports overall doing well. No new neurologic symptoms. Left face numbness continues to wax and wane. Left arm and leg strength with trace weakness. Overall feels improving. No new vision, motor, sensory, language, headache, gait, coordination changes. Tolerating meds. Some positional lightheadedness with standing up, sbp ranging from 120s (usually with positional lightheadedness) to 160s.     Problem List:  Patient Active Problem List   Diagnosis    Hypokalemia    Hyperglycemia    Hypertensive urgency    TIA (transient ischemic attack)    Acute ischemic VBA thalamic stroke, right (HCC)       PMHx:  Past Medical History:    Essential hypertension    High blood pressure    Stroke (HCC)       PSHx:  Past Surgical History:   Procedure Laterality Date    Repair ing hernia,5+y/o,reducibl         SocHx:  Social History     Socioeconomic History    Marital status:    Tobacco Use    Smoking status: Never    Smokeless tobacco: Never   Vaping Use    Vaping status: Never Used   Substance and Sexual Activity    Alcohol use: Yes     Comment: Socially    Drug use: Never     Social Determinants of Health     Food Insecurity: Low Risk  (12/20/2023)    Received from [facility]    Food Insecurity     Have there been times that your food ran out, and you didn't have money to get more?: No     Are there times that you worry that this might happen?: No   Transportation Needs: Low Risk  (12/29/2023)    Received from [facility]    Transportation Needs     Has lack of transportation kept you from medical appointments, meetings, work, or from getting things needed for daily living: No   No tobacco. Drinks alcohol socially.     Family History:  History reviewed. No pertinent  family history.    Current Medications:  Current Outpatient Medications   Medication Sig Dispense Refill    sennosides 8.6 MG Oral Tab Take 2 tablets (17.2 mg total) by mouth.      losartan 100 MG Oral Tab TAKE 1 TABLET BY MOUTH DAILY. HOLD IF SYSTOLIC BP IS LESS THAN 110 INDICATIONS: HIGH BLOOD PRESSURE      latanoprost 0.005 % Ophthalmic Solution Place 1 drop into both eyes Before Dinner.      carvedilol 25 MG Oral Tab Take 1 tablet (25 mg total) by mouth 2 (two) times daily.      cyanocobalamin ([employer] VITAMIN B-12) 1000 MCG Oral Tab Take 1 tablet (1,000 mcg total) by mouth daily.      ergocalciferol 1.25 MG (60360 UT) Oral Cap Take 1 capsule (50,000 Units total) by mouth once a week.      atorvastatin 40 MG Oral Tab Take 1 tablet (40 mg total) by mouth nightly. 90 tablet 1    aspirin 81 MG Oral Chew Tab Chew 1 tablet (81 mg total) by mouth daily. 30 tablet 1    hydrALAZINE 25 MG Oral Tab TAKE 1 TAB BY MOUTH 3 TIMES DAILY. HOLD SYSTOLIC BP IS LESS THAN 110 INDICATION HIGH BLOOD PRESSURE (Patient not taking: Reported on 4/10/2024)      magnesium 30 MG Oral Tab Take 1 tablet (30 mg total) by mouth 2 (two) times daily. (Patient not taking: Reported on 2/7/2024)      furosemide 40 MG Oral Tab Take 1 tablet (40 mg total) by mouth daily. (Patient not taking: Reported on 4/10/2024)         ROS:  No recent big changes in weight, blood in stool, fevers    Objective/Physical Exam:    Vital Signs:  Blood pressure 160/88, pulse 70, resp. rate 16, height 61\", weight 172 lb (78 kg), SpO2 98%.  General: Alert, good recall of personal medical history    Respiratory: Non labored breathing on room air    Cardiovascular: Regular rate    Mental status: Alert, oriented, language fluent, comprehension intact    Cranial nerves: Optic disc margins sharp VF full to confrontation bilaterally. Pupils equal, round, equally reactive to light and accommodation. Extraocular eye movements intact without nystagmus. Face sensation intact to light  touch. Face strength symmetric and intact. No dysarthria.    Motor:   Power:   -Right upper extremity: shoulder abductors 5, elbow extensors 5, elbow flexors 5, wrist extensors 5, finger extension 5  -Left upper extremity: shoulder abductors 5, elbow extensors 5, elbow flexors 5, wrist extensors 5, finger extension 5  -Right lower extremity: hip flexion 5, knee extension 5, dorsiflexion 5  -Left lower extremity: hip flexion 5, knee extension 5, dorsiflexion 5  Tone: Normal   Bulk: Normal  Abnormal movements: None    Sensory: Intact to light touch and vibration>20s thumbs and ~18 s great toes    Coordination: trace dysmetria heel to shin and finger nose on left    Reflexes: Right/Left: Biceps 2/2, triceps 2/2, brachioradialis 2/2, hoffmans negative. Patella 2/2, achilles 2/2.    Gait: Narrow based, symmetric arm swing, no gait assist. Presented with walker.     Labs:     Component      Latest Ref Rng 12/16/2023 12/17/2023   WBC      4.0 - 11.0 x10(3) uL 5.8     RBC      3.80 - 5.80 x10(6)uL 5.62     Hemoglobin      13.0 - 17.5 g/dL 16.2     Hematocrit      39.0 - 53.0 % 50.7     Platelet Count      150.0 - 450.0 10(3)uL 203.0     MCV      80.0 - 100.0 fL 90.2     MCH      26.0 - 34.0 pg 28.8     MCHC      31.0 - 37.0 g/dL 32.0     RDW      % 11.8     Prelim Neutrophil Abs      1.50 - 7.70 x10 (3) uL 2.79     Neutrophils Absolute      1.50 - 7.70 x10(3) uL 2.79     Lymphocytes Absolute      1.00 - 4.00 x10(3) uL 2.18     Monocytes Absolute      0.10 - 1.00 x10(3) uL 0.64     Eosinophils Absolute      0.00 - 0.70 x10(3) uL 0.11     Basophils Absolute      0.00 - 0.20 x10(3) uL 0.02     Immature Granulocyte Absolute      0.00 - 1.00 x10(3) uL 0.03     Neutrophils %      % 48.4     Lymphocytes %      % 37.8     Monocytes %      % 11.1     Eosinophils %      % 1.9     Basophils %      % 0.3     Immature Granulocyte %      % 0.5     Glucose      70 - 99 mg/dL 131 (H)     Sodium      136 - 145 mmol/L 139     Potassium       3.5 - 5.1 mmol/L 3.5     Chloride      98 - 112 mmol/L 105     Carbon Dioxide, Total      21.0 - 32.0 mmol/L 30.0     ANION GAP      0 - 18 mmol/L 4     BUN      9 - 23 mg/dL 13     CREATININE      0.70 - 1.30 mg/dL 1.17     CALCIUM      8.5 - 10.1 mg/dL 8.5     CALCULATED OSMOLALITY      275 - 295 mOsm/kg 290     EGFR      >=60 mL/min/1.73m2 64     AST (SGOT)      15 - 37 U/L 26     ALT (SGPT)      16 - 61 U/L 27     ALKALINE PHOSPHATASE      45 - 117 U/L 115     Total Bilirubin      0.1 - 2.0 mg/dL 0.6     PROTEIN, TOTAL      6.4 - 8.2 g/dL 7.6     Albumin      3.4 - 5.0 g/dL 3.7     Globulin      2.8 - 4.4 g/dL 3.9     A/G Ratio      1.0 - 2.0  0.9 (L)     Cholesterol, Total      <200 mg/dL  125    HDL Cholesterol      40 - 59 mg/dL  47    Triglycerides      30 - 149 mg/dL  119    LDL Cholesterol Calc      <100 mg/dL  57    VLDL      0 - 30 mg/dL  17    NON-HDL CHOLESTEROL      <130 mg/dL  78    PT      11.6 - 14.8 seconds 14.4     INR      0.80 - 1.20  1.12     HEMOGLOBIN A1c      <5.7 %  6.1 (H)    ESTIMATED AVERAGE GLUCOSE      68 - 126 mg/dL  128 (H)    Troponin I (High Sensitivity)      <=79 ng/L 11     APTT      23.3 - 35.6 seconds 34.2        Legend:  (H) High  (L) Low    Imaging:  CTA head neck 12/2023  FINDINGS:    Ventricles and sulci are within normal limits.  There is no midline shift or mass-effect.  The basal cisterns are patent.  The gray-white matter differentiation is intact.     There is no acute intracranial hemorrhage or extra-axial fluid collection.  Lucencies throughout the white matter are noted.     There is no evident fracture.  Mild to moderate mucoperiosteal thickening of the paranasal sinuses.     The upper cervical, petrous, cavernous, and supraclinoid internal carotid arteries are unremarkable.  An anterior communicating artery is seen.  The branches of the anterior cerebral and middle cerebral arteries are unremarkable.  A small right posterior   communicating artery is seen along  with a small left posterior communicating artery.  The branches of the posterior cerebral and superior cerebellar arteries are unremarkable.  The basilar artery has a normal course and caliber.  The bilateral vertebral   arteries are unremarkable.  Takeoff of the left PICA is noted     There is a 3-vessel aortic arch.  The origins of the branch vessels appear widely patent.  The bilateral subclavian arteries and innominate artery are unremarkable.     Calcified plaques at the bifurcation of bilateral common carotid arteries is noted.  No significant stenosis.  There is no evidence of hemodynamically significant stenosis in the carotid bulbs by NASCET criteria.  The cervical internal carotid arteries  are widely patent.     The vertebral arteries originate from the subclavian arteries.  The origins of the vertebral arteries are patent.  The cervical vertebral arteries are widely patent.  The left vertebral artery is mildly dominant.                      Impression   CONCLUSION:       1. Sequelae of moderate chronic small vessel ischemic disease.  No evidence of hemorrhage or extra-axial fluid collection.     2. Prominent right perihilar lymph node measures 2.6 x 2.1 cm.  Interstitial abnormalities in the lungs is noted.     3. No significant stenosis or aneurysmal dilatation involving the major arterial structures in the head or neck      MRI brain MRA brain   MRI BRAIN  FINDINGS:       Mild prominence of the sulci.       There is no midline shift or mass effect.       The basal cisterns are patent.       The craniocervical junction is unremarkable.       Midline structures including corpus callosum, optic chiasm and cerebellar tonsils are overall unremarkable.     There is no acute intracranial hemorrhage or extra-axial fluid collection identified.       Marked FLAIR abnormalities the white matter.       Susceptibility in the left parasagittal frontal lobe along with the right parasagittal parietal lobe is noted.   Susceptibility in the region of the right dentate nucleus is noted.     There is no restricted diffusion to suggest acute ischemia/infarction.     The visualized paranasal sinuses and mastoid air cells are unremarkable.       The expected major intracranial flow voids are present.     MRA BRAIN  The upper cervical, petrous, cavernous, and supraclinoid internal carotid arteries are unremarkable.       An anterior communicating artery is seen.       The branches of the anterior cerebral and middle cerebral arteries are unremarkable.       Marked severe stenosis in the proximal left P2 segment is noted.  Left posterior cerebral artery distal to this is patent.     The basilar artery has a normal course and caliber.       The bilateral vertebral arteries are unremarkable.  The origins of the bilateral PICA are seen.                     Impression   CONCLUSION:       1. No evidence of an acute infarct.     2. Sequelae of marked severe chronic small vessel ischemic disease.     3. Mild global parenchymal volume loss.     4. Multiple foci of gradient susceptibility in the brain parenchyma may be sequelae of chronic hemorrhagic products.     5. 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  The left posterior cerebral artery distal to this is widely patent.     MR Brain DWI   FINDINGS:    There is an approximately 1.3 cm focus of restricted diffusion in the right thalamus compatible with acute infarct.  This is new in comparison to 12/16/2023.                   Impression   CONCLUSION:  Interval development of 1.3 cm focus of restricted diffusion in the right thalamus compatible with acute infarct, which is new from 12/16/2023.       TTE 12/17/23  Conclusions:     1. Left ventricle: The cavity size was normal. Wall thickness was at the      upper limits of normal. Systolic function was normal. The estimated      ejection fraction was 60-65%. Left ventricular diastolic function       parameters were normal for the patient's age.   2. Right ventricle: The cavity size was normal. Systolic function was      normal.   3. Left atrium: The left atrial volume was normal.   4. Atrial septum: Agitated saline contrast study showed no atrial level      shunt, at baseline or with provocation.   5. Tricuspid valve: There was mild regurgitation.   6. Pulmonary arteries: Systolic pressure was moderately increased, estimated      to be 55mm Hg. Estimated pulmonary artery diastolic pressure was 7mm Hg.   7. Pericardium, extracardiac: There was no pericardial effusion.   Impressions:  No previous study from [facility] was   available for comparison. This study is compared with previous dated   3/9/2019: Improvement in pericardial effusion previously noted. Otherwise,   findings are comparable.     Assessment:  Torin Cam is a(n) 78 year old male with a medical history of hypertension who presents for stroke follow up. Presented with left sided weakness and left facial numbness (symptoms waxed and waned 12/2023). He reports missing antihypertensives for two days prior to admission, which is atypical for him. Initial MRI brain with marked chronic flair white matter changes and mild volume loss and SWI changes. Had recurrence of symptoms, repeat MRI brain with right thalamic ischemic stroke. CTA and MRA head/neck with some extracranial atherosclerotic disease and intracranial atherosclerotic disease (ayesha marked stenosis left PCA).  TTE with borderline high wall thickness LV, normal LA volume, no shunt, elevated pulmonary artery systolic pressure. A1C 6.1, LDL 57. Started on dual antiplatelet therapy with aspirin and plavix (not on antithrombotic prior to admission). Stroke mechanism favored to be small vessel/lacunar given location and morphology. Now on aspirin monotherapy given suspected stroke mechanism. BP remains elevated, would see primary care provider (PCP) to continue med titration.        Plan:  -Follow up with PCP regarding right perihilar lymph node and elevated pulmonary artery pressure and blood pressure management  --Stroke risk reduction:    -Cholesterol: goal LDL < 70 mg/dL, high intensity statin continue atorvastatin 40 mg daily   -Blood pressure: goal < 130/80 mm Hg   -Healthy diet (produce rich, DASH diet, Mediterranean diet)   -Regular exercise (Moderate to vigorous-intensity aerobic exercise for at least 30 minutes a day, 3 to 4 times a week)   -Antithrombotic: continue aspirin 81 mg daily indefinitely, stop clopidogrel 75 mg daily   -Pre-Diabetes: work on good glucose control  -Will send my note to PCP, Dr. Talamantes      1. Right thalamic stroke (HCC)    2. Medication management      Total time 34 minutes including chart review, eliciting history, physical exam, and counseling.    Jud Guo, DO

## 2024-04-14 ENCOUNTER — HOSPITAL ENCOUNTER (EMERGENCY)
Facility: HOSPITAL | Age: 79
Discharge: HOME | End: 2024-04-14
Attending: EMERGENCY MEDICINE
Payer: MEDICARE

## 2024-04-14 VITALS
OXYGEN SATURATION: 98 % | SYSTOLIC BLOOD PRESSURE: 186 MMHG | HEIGHT: 66 IN | WEIGHT: 159 LBS | BODY MASS INDEX: 25.55 KG/M2 | TEMPERATURE: 98 F | RESPIRATION RATE: 15 BRPM | DIASTOLIC BLOOD PRESSURE: 87 MMHG | HEART RATE: 63 BPM

## 2024-04-14 DIAGNOSIS — I95.2 HYPOTENSION DUE TO DRUGS: Primary | ICD-10-CM

## 2024-04-14 LAB
ALBUMIN SERPL-MCNC: 3.4 G/DL (ref 3.4–5)
ALBUMIN/GLOB SERPL: 0.9 {RATIO} (ref 1–2)
ALP LIVER SERPL-CCNC: 95 U/L
ALT SERPL-CCNC: 24 U/L
ANION GAP SERPL CALC-SCNC: 6 MMOL/L (ref 0–18)
AST SERPL-CCNC: 26 U/L (ref 15–37)
BASOPHILS # BLD AUTO: 0.04 X10(3) UL (ref 0–0.2)
BASOPHILS NFR BLD AUTO: 0.5 %
BILIRUB SERPL-MCNC: 0.6 MG/DL (ref 0.1–2)
BUN BLD-MCNC: 17 MG/DL (ref 9–23)
CALCIUM BLD-MCNC: 9.5 MG/DL (ref 8.5–10.1)
CHLORIDE SERPL-SCNC: 104 MMOL/L (ref 98–112)
CO2 SERPL-SCNC: 30 MMOL/L (ref 21–32)
CREAT BLD-MCNC: 1.49 MG/DL
EGFRCR SERPLBLD CKD-EPI 2021: 48 ML/MIN/1.73M2 (ref 60–?)
EOSINOPHIL # BLD AUTO: 0.16 X10(3) UL (ref 0–0.7)
EOSINOPHIL NFR BLD AUTO: 1.9 %
ERYTHROCYTE [DISTWIDTH] IN BLOOD BY AUTOMATED COUNT: 12.2 %
GLOBULIN PLAS-MCNC: 3.8 G/DL (ref 2.8–4.4)
GLUCOSE BLD-MCNC: 134 MG/DL (ref 70–99)
HCT VFR BLD AUTO: 44.5 %
HGB BLD-MCNC: 14.2 G/DL
IMM GRANULOCYTES # BLD AUTO: 0.05 X10(3) UL (ref 0–1)
IMM GRANULOCYTES NFR BLD: 0.6 %
LYMPHOCYTES # BLD AUTO: 2.88 X10(3) UL (ref 1–4)
LYMPHOCYTES NFR BLD AUTO: 34.8 %
MCH RBC QN AUTO: 28.5 PG (ref 26–34)
MCHC RBC AUTO-ENTMCNC: 31.9 G/DL (ref 31–37)
MCV RBC AUTO: 89.2 FL
MONOCYTES # BLD AUTO: 0.83 X10(3) UL (ref 0.1–1)
MONOCYTES NFR BLD AUTO: 10 %
NEUTROPHILS # BLD AUTO: 4.32 X10 (3) UL (ref 1.5–7.7)
NEUTROPHILS # BLD AUTO: 4.32 X10(3) UL (ref 1.5–7.7)
NEUTROPHILS NFR BLD AUTO: 52.2 %
OSMOLALITY SERPL CALC.SUM OF ELEC: 294 MOSM/KG (ref 275–295)
PLATELET # BLD AUTO: 213 10(3)UL (ref 150–450)
POTASSIUM SERPL-SCNC: 4 MMOL/L (ref 3.5–5.1)
PROT SERPL-MCNC: 7.2 G/DL (ref 6.4–8.2)
RBC # BLD AUTO: 4.99 X10(6)UL
SODIUM SERPL-SCNC: 140 MMOL/L (ref 136–145)
WBC # BLD AUTO: 8.3 X10(3) UL (ref 4–11)

## 2024-04-14 PROCEDURE — 93005 ELECTROCARDIOGRAM TRACING: CPT

## 2024-04-14 PROCEDURE — 85025 COMPLETE CBC W/AUTO DIFF WBC: CPT | Performed by: EMERGENCY MEDICINE

## 2024-04-14 PROCEDURE — 99285 EMERGENCY DEPT VISIT HI MDM: CPT

## 2024-04-14 PROCEDURE — 36415 COLL VENOUS BLD VENIPUNCTURE: CPT

## 2024-04-14 PROCEDURE — 80053 COMPREHEN METABOLIC PANEL: CPT | Performed by: EMERGENCY MEDICINE

## 2024-04-14 PROCEDURE — 99284 EMERGENCY DEPT VISIT MOD MDM: CPT

## 2024-04-14 PROCEDURE — 93010 ELECTROCARDIOGRAM REPORT: CPT

## 2024-04-14 NOTE — ED INITIAL ASSESSMENT (HPI)
Pt to er with adult family  Pt reports interm dizziness since 12/2023-stroke  Took b/p med at 0800 today= low b/p at 1400=\"80's\"  Before dizziness vision changes for couple minutes today  Denies ha/pain/nausea  States residual left face numb since stroke in 12/23  Low b/p at home = ate lunch and drank fluids   Now in [facility] 153/84

## 2024-04-15 LAB
ATRIAL RATE: 69 BPM
P AXIS: 69 DEGREES
P-R INTERVAL: 178 MS
Q-T INTERVAL: 392 MS
QRS DURATION: 90 MS
QTC CALCULATION (BEZET): 420 MS
R AXIS: 40 DEGREES
T AXIS: 85 DEGREES
VENTRICULAR RATE: 69 BPM

## 2024-04-15 NOTE — ED PROVIDER NOTES
Patient Seen in: [facility] Emergency Department      History     Chief Complaint   Patient presents with    Hypotension    Dizziness    Blurred Vision     Stated Complaint: HYPOtensive, dizzy, blurred vision    Subjective:   HPI    78 male presents to the emergency department stating that this morning he was feeling very lightheaded and dizzy and he checked his blood pressure and he had a systolic in the 80s.  He checked it twice and it continues to be low.  He states he then rested and his son came over and they came to the ER for further evaluation this evening no chest pain or chest discomfort.  No shortness of breath.  He states now he feels completely fine.  He states that he took his blood pressure this morning at around 8:00 and took all of his blood pressure medications at that time.  He states that his symptoms began around 1030.  He then checked his blood pressure again and it was low.  No other acute complaints    Objective:   Past Medical History:    Essential hypertension    High blood pressure    Stroke (HCC)              Past Surgical History:   Procedure Laterality Date    Repair ing hernia,5+y/o,reducibl                  Social History     Socioeconomic History    Marital status:    Tobacco Use    Smoking status: Never    Smokeless tobacco: Never   Vaping Use    Vaping status: Never Used   Substance and Sexual Activity    Alcohol use: Yes     Comment: Socially    Drug use: Never     Social Determinants of Health     Food Insecurity: Low Risk  (12/20/2023)    Received from [facility]    Food Insecurity     Have there been times that your food ran out, and you didn't have money to get more?: No     Are there times that you worry that this might happen?: No   Transportation Needs: Low Risk  (12/29/2023)    Received from [facility]    Transportation Needs     Has lack of  transportation kept you from medical appointments, meetings, work, or from getting things needed for daily living: No              Review of Systems   All other systems reviewed and are negative.      Positive for stated complaint: HYPOtensive, dizzy, blurred vision  Other systems are as noted in HPI.  Constitutional and vital signs reviewed.      All other systems reviewed and negative except as noted above.    Physical Exam     ED Triage Vitals [04/14/24 1752]   /84   Pulse 74   Resp 19   Temp 98.1 °F (36.7 °C)   Temp src Temporal   SpO2 95 %   O2 Device None (Room air)       Current:/77   Pulse 63   Temp 98.1 °F (36.7 °C) (Temporal)   Resp 15   Ht 167.6 cm (5' 6\")   Wt 72.1 kg   SpO2 98%   BMI 25.66 kg/m²         Physical Exam  Vitals and nursing note reviewed. Chaperone present: Son in room.   Constitutional:       General: He is not in acute distress.     Appearance: Normal appearance. He is well-developed.   HENT:      Head: Normocephalic and atraumatic.   Cardiovascular:      Rate and Rhythm: Normal rate and regular rhythm.      Pulses: Normal pulses.      Heart sounds: Normal heart sounds.   Pulmonary:      Effort: Pulmonary effort is normal.      Breath sounds: Normal breath sounds. No stridor.   Abdominal:      General: Bowel sounds are normal.      Palpations: Abdomen is soft.   Musculoskeletal:         General: Normal range of motion.      Cervical back: Normal range of motion and neck supple.      Right lower leg: No edema.      Left lower leg: No edema.   Lymphadenopathy:      Cervical: No cervical adenopathy.   Skin:     General: Skin is warm and dry.      Capillary Refill: Capillary refill takes less than 2 seconds.   Neurological:      General: No focal deficit present.      Mental Status: He is alert and oriented to person, place, and time.              ED Course     Labs Reviewed   COMP METABOLIC PANEL (14) - Abnormal; Notable for the following components:       Result Value     Glucose 134 (*)     Creatinine 1.49 (*)     eGFR-Cr 48 (*)     A/G Ratio 0.9 (*)     All other components within normal limits   CBC WITH DIFFERENTIAL WITH PLATELET    Narrative:     The following orders were created for panel order CBC With Differential With Platelet.  Procedure                               Abnormality         Status                     ---------                               -----------         ------                     CBC W/ DIFFERENTIAL[[id number]]                              Final result                 Please view results for these tests on the individual orders.   CBC W/ DIFFERENTIAL     EKG    Rate, intervals and axes as noted on EKG Report.  Rate: 69  Rhythm: Sinus Rhythm  Reading: ST segment elevation no interval change from previous EKG                          MDM      Patient had IV established and blood work obtained.  He had orthostatics performed and was not orthostatic.  He does not have a drop in his hemoglobin to be a source of low blood pressure.  He not have any significant electrolyte abnormalities.  Potentially make him feel lightheaded such as hyponatremia or low glucose.  I reviewed his medications.  He does take a number of hypertensive medications at the same time.  It does appear that the hydralazine is a newer medication.  I advised that he take his blood pressure medicines but hold his hydralazine and perhaps take the hydralazine in the afternoon this may give him better blood pressure control throughout the day.  Certainly if his blood pressure was low he should hold the hydralazine.  I advise he follow-up with his primary care physician and keep a log of his blood pressures so that they may adjust medications as needed.  Patient was discharged with the son in good condition                                   Medical Decision Making      Disposition and Plan     Clinical Impression:  1. Hypotension due to drugs         Disposition:  Discharge  4/14/2024  9:15  pm    Follow-up:  Kami Talamantes DO  [address] [address]  [phone]    Follow up            Medications Prescribed:  Current Discharge Medication List

## 2024-04-15 NOTE — DISCHARGE INSTRUCTIONS
Hold your hydralazine and do not take it in the morning.  If your blood pressure is continued to be elevated in the afternoon take the hydralazine at that time.  Be sure to stay well-hydrated and change positions slowly

## 2024-10-09 ENCOUNTER — OFFICE VISIT (OUTPATIENT)
Dept: NEUROLOGY | Facility: CLINIC | Age: 79
End: 2024-10-09
Payer: MEDICARE

## 2024-10-09 VITALS
SYSTOLIC BLOOD PRESSURE: 140 MMHG | RESPIRATION RATE: 16 BRPM | WEIGHT: 164 LBS | OXYGEN SATURATION: 99 % | HEIGHT: 66 IN | DIASTOLIC BLOOD PRESSURE: 100 MMHG | BODY MASS INDEX: 26.36 KG/M2 | HEART RATE: 71 BPM

## 2024-10-09 DIAGNOSIS — I63.81 RIGHT THALAMIC STROKE (HCC): Primary | ICD-10-CM

## 2024-10-09 DIAGNOSIS — R20.2 NUMBNESS AND TINGLING OF LEFT SIDE OF FACE: ICD-10-CM

## 2024-10-09 DIAGNOSIS — R29.898 LEFT LEG WEAKNESS: ICD-10-CM

## 2024-10-09 DIAGNOSIS — R20.0 NUMBNESS AND TINGLING OF LEFT SIDE OF FACE: ICD-10-CM

## 2024-10-09 DIAGNOSIS — R13.10 DYSPHAGIA, UNSPECIFIED TYPE: ICD-10-CM

## 2024-10-09 PROCEDURE — 3008F BODY MASS INDEX DOCD: CPT | Performed by: STUDENT IN AN ORGANIZED HEALTH CARE EDUCATION/TRAINING PROGRAM

## 2024-10-09 PROCEDURE — 1160F RVW MEDS BY RX/DR IN RCRD: CPT | Performed by: STUDENT IN AN ORGANIZED HEALTH CARE EDUCATION/TRAINING PROGRAM

## 2024-10-09 PROCEDURE — 99214 OFFICE O/P EST MOD 30 MIN: CPT | Performed by: STUDENT IN AN ORGANIZED HEALTH CARE EDUCATION/TRAINING PROGRAM

## 2024-10-09 PROCEDURE — 3077F SYST BP >= 140 MM HG: CPT | Performed by: STUDENT IN AN ORGANIZED HEALTH CARE EDUCATION/TRAINING PROGRAM

## 2024-10-09 PROCEDURE — 1159F MED LIST DOCD IN RCRD: CPT | Performed by: STUDENT IN AN ORGANIZED HEALTH CARE EDUCATION/TRAINING PROGRAM

## 2024-10-09 PROCEDURE — 3080F DIAST BP >= 90 MM HG: CPT | Performed by: STUDENT IN AN ORGANIZED HEALTH CARE EDUCATION/TRAINING PROGRAM

## 2024-10-09 RX ORDER — ASPIRIN 81 MG/1
81 TABLET, CHEWABLE ORAL DAILY
Qty: 365 TABLET | Refills: 2 | COMMUNITY
Start: 2024-10-09

## 2024-10-09 NOTE — PATIENT INSTRUCTIONS
--Stroke risk reduction:    -Cholesterol: goal LDL < 70 mg/dL, high intensity statin continue atorvastatin 40 mg daily (please have primary care doctor refill)   -Blood pressure: goal normotension , continue to work with cardiology and primary care provider (PCP)    -Healthy diet (produce rich, DASH diet, Mediterranean diet)   -Regular exercise (Moderate to vigorous-intensity aerobic exercise for at least 30 minutes a day, 3 to 4 times a week)   -Antithrombotic: continue aspirin 81 mg daily indefinitely (buy over the counter)   -Pre-Diabetes: work on good glucose control  -Physical therapy and speech therapy referral   -Video swallow study with radiology  -Let me know if you want to trial gabapentin low dose for neuropathic pain      Refill policies:    Allow 2-3 business days for refills; controlled substances may take longer.  Contact your pharmacy at least 5 days prior to running out of medication and have them send an electronic request or submit request through the “request refill” option in your [employer] account.  Refills are not addressed on weekends; covering physicians do not authorize routine medications on weekends.  No narcotics or controlled substances are refilled after noon on Fridays or by on call physicians.  By law, narcotics must be electronically prescribed.  A 30 day supply with no refills is the maximum allowed.  If your prescription is due for a refill, you may be due for a follow up appointment.  To best provide you care, patients receiving routine medications need to be seen at least once a year.  Patients receiving narcotic/controlled substance medications need to be seen at least once every 3 months.  In the event that your preferred pharmacy does not have the requested medication in stock (e.g. Backordered), it is your responsibility to find another pharmacy that has the requested medication available.  We will gladly send a new prescription to that pharmacy at your request.    Scheduling  Tests:    If your physician has ordered radiology tests such as MRI or CT scans, please contact Central Scheduling at [phone] right away to schedule the test.  Once scheduled, the [facility] Centralized Referral Team will work with your insurance carrier to obtain pre-certification or prior authorization.  Depending on your insurance carrier, approval may take 3-10 days.  It is highly recommended patients assure they have received an authorization before having a test performed.  If test is done without insurance authorization, patient may be responsible for the entire amount billed.      Precertification and Prior Authorizations:  If your physician has recommended that you have a procedure or additional testing performed the [facility] Centralized Referral Team will contact your insurance carrier to obtain pre-certification or prior authorization.    You are strongly encouraged to contact your insurance carrier to verify that your procedure/test has been approved and is a COVERED benefit.  Although the [facility] Centralized Referral Team does its due diligence, the insurance carrier gives the disclaimer that \"Although the procedure is authorized, this does not guarantee payment.\"    Ultimately the patient is responsible for payment.   Thank you for your understanding in this matter.  Paperwork Completion:  If you require FMLA or disability paperwork for your recovery, please make sure to either drop it off or have it faxed to our office at [phone]. Be sure the form has your name and date of birth on it.  The form will be faxed to our Forms Department and they will complete it for you.  There is a 25$ fee for all forms that need to be filled out.  Please be aware there is a 10-14 day turnaround time.  You will need to sign a release of information (MARTELL) form if your paperwork does not come with one.  You may call the Forms Department with any questions at [phone].  Their fax number is [phone].

## 2024-10-09 NOTE — PROGRESS NOTES
Neurology Office Visit    Torin Cam   Date of Birth 7/23/1945    Interval history October 09, 2024:   -PCP titrating antihypertensives, referred to cardiology for hypertension and pulm hypertension mgmt  -Ophthalmologist Dr. Hsieh diagnosed with branch retinal vein occlusion, advised treatment for macular edema    Reports vision in left eye improved some with the treatment of macular edema. Presents with son, reports some dizziness after taking blood pressure meds, meds being titrated.     He reports the leg weakness and face numbness continue to be a big issue which makes it harder to eat. No falls. No bleeding from the aspirin. No new weakness, numbness, bowel/bladder issues, seizures.     Problem List:  Patient Active Problem List   Diagnosis    Hypokalemia    Hyperglycemia    Hypertensive urgency    TIA (transient ischemic attack)    Right thalamic stroke (HCC)       PMHx:  Past Medical History:    Essential hypertension    High blood pressure    Stroke (HCC)       PSHx:  Past Surgical History:   Procedure Laterality Date    Repair ing hernia,5+y/o,reducibl         SocHx:  Social History     Socioeconomic History    Marital status:    Tobacco Use    Smoking status: Never    Smokeless tobacco: Never   Vaping Use    Vaping status: Never Used   Substance and Sexual Activity    Alcohol use: Yes     Comment: Socially    Drug use: Never   Other Topics Concern    Caffeine Concern Yes     Comment: Coffee    Exercise No     Social Drivers of Health     Food Insecurity: Low Risk  (12/20/2023)    Received from [facility]    Food Insecurity     Have there been times that your food ran out, and you didn't have money to get more?: No     Are there times that you worry that this might happen?: No   Transportation Needs: Low Risk  (12/29/2023)    Received from [facility]    Transportation Needs     Has  lack of transportation kept you from medical appointments, meetings, work, or from getting things needed for daily living: No   No tobacco. Drinks alcohol socially.     Family History:  No family history on file.    Current Medications:  Current Outpatient Medications   Medication Sig Dispense Refill    aspirin 81 MG Oral Chew Tab Chew 1 tablet (81 mg total) by mouth daily. To be continued indefinitely 365 tablet 2    losartan 100 MG Oral Tab TAKE 1 TABLET BY MOUTH DAILY. HOLD IF SYSTOLIC BP IS LESS THAN 110 INDICATIONS: HIGH BLOOD PRESSURE      latanoprost 0.005 % Ophthalmic Solution Place 1 drop into both eyes Before Dinner.      cyanocobalamin ([employer] VITAMIN B-12) 1000 MCG Oral Tab Take 1 tablet (1,000 mcg total) by mouth daily.      atorvastatin 40 MG Oral Tab Take 1 tablet (40 mg total) by mouth nightly. 90 tablet 1    sennosides 8.6 MG Oral Tab Take 2 tablets (17.2 mg total) by mouth. (Patient not taking: Reported on 10/9/2024)      ergocalciferol 1.25 MG (14289 UT) Oral Cap Take 1 capsule (50,000 Units total) by mouth once a week.         ROS:  No recent big changes in weight, blood in stool, fevers    Objective/Physical Exam:    Vital Signs:  Blood pressure (!) 140/100, pulse 71, resp. rate 16, height 66\", weight 164 lb (74.4 kg), SpO2 99%.  General: Alert, good recall of personal medical history    Respiratory: Non labored breathing on room air    Cardiovascular: Regular rate    Mental status: Alert, oriented, language fluent, comprehension intact    Cranial nerves: VF reduced to confrontation left eye (recent BRVO per ophthalmology and pt). Pupils equal, round, equally reactive to light and accommodation. Extraocular eye movements intact without nystagmus. Face sensation intact to light touch. Face strength symmetric and intact. No dysarthria.    Motor:   Power:   -Right upper extremity: shoulder abductors 5, wrist extensors 5, finger extension 5  -Left upper extremity: shoulder abductors 5, wrist extensors  5, finger extension 5, trace left pronator drift  -Right lower extremity: hip flexion 5   -Left lower extremity: hip flexion 4.8  Tone: Normal   Bulk: Normal  Abnormal movements: None    Sensory: Intact to light touch distal extr    Coordination: trace dysmetria finger nose on left    Reflexes: Right/Left: Biceps 2/2, triceps 2/2, brachioradialis 2/2, hoffmans negative. Patella 2/2, achilles 2/2.    Gait: Narrow based, symmetric arm swing, using walker, trace left leg circumduction    Labs:     Component      Latest Ref Rng 12/16/2023 12/17/2023   WBC      4.0 - 11.0 x10(3) uL 5.8     RBC      3.80 - 5.80 x10(6)uL 5.62     Hemoglobin      13.0 - 17.5 g/dL 16.2     Hematocrit      39.0 - 53.0 % 50.7     Platelet Count      150.0 - 450.0 10(3)uL 203.0     MCV      80.0 - 100.0 fL 90.2     MCH      26.0 - 34.0 pg 28.8     MCHC      31.0 - 37.0 g/dL 32.0     RDW      % 11.8     Prelim Neutrophil Abs      1.50 - 7.70 x10 (3) uL 2.79     Neutrophils Absolute      1.50 - 7.70 x10(3) uL 2.79     Lymphocytes Absolute      1.00 - 4.00 x10(3) uL 2.18     Monocytes Absolute      0.10 - 1.00 x10(3) uL 0.64     Eosinophils Absolute      0.00 - 0.70 x10(3) uL 0.11     Basophils Absolute      0.00 - 0.20 x10(3) uL 0.02     Immature Granulocyte Absolute      0.00 - 1.00 x10(3) uL 0.03     Neutrophils %      % 48.4     Lymphocytes %      % 37.8     Monocytes %      % 11.1     Eosinophils %      % 1.9     Basophils %      % 0.3     Immature Granulocyte %      % 0.5     Glucose      70 - 99 mg/dL 131 (H)     Sodium      136 - 145 mmol/L 139     Potassium      3.5 - 5.1 mmol/L 3.5     Chloride      98 - 112 mmol/L 105     Carbon Dioxide, Total      21.0 - 32.0 mmol/L 30.0     ANION GAP      0 - 18 mmol/L 4     BUN      9 - 23 mg/dL 13     CREATININE      0.70 - 1.30 mg/dL 1.17     CALCIUM      8.5 - 10.1 mg/dL 8.5     CALCULATED OSMOLALITY      275 - 295 mOsm/kg 290     EGFR      >=60 mL/min/1.73m2 64     AST (SGOT)      15 - 37 U/L 26      ALT (SGPT)      16 - 61 U/L 27     ALKALINE PHOSPHATASE      45 - 117 U/L 115     Total Bilirubin      0.1 - 2.0 mg/dL 0.6     PROTEIN, TOTAL      6.4 - 8.2 g/dL 7.6     Albumin      3.4 - 5.0 g/dL 3.7     Globulin      2.8 - 4.4 g/dL 3.9     A/G Ratio      1.0 - 2.0  0.9 (L)     Cholesterol, Total      <200 mg/dL  125    HDL Cholesterol      40 - 59 mg/dL  47    Triglycerides      30 - 149 mg/dL  119    LDL Cholesterol Calc      <100 mg/dL  57    VLDL      0 - 30 mg/dL  17    NON-HDL CHOLESTEROL      <130 mg/dL  78    PT      11.6 - 14.8 seconds 14.4     INR      0.80 - 1.20  1.12     HEMOGLOBIN A1c      <5.7 %  6.1 (H)    ESTIMATED AVERAGE GLUCOSE      68 - 126 mg/dL  128 (H)    Troponin I (High Sensitivity)      <=79 ng/L 11     APTT      23.3 - 35.6 seconds 34.2        Legend:  (H) High  (L) Low    Imaging:  CTA head neck 12/2023  FINDINGS:    Ventricles and sulci are within normal limits.  There is no midline shift or mass-effect.  The basal cisterns are patent.  The gray-white matter differentiation is intact.     There is no acute intracranial hemorrhage or extra-axial fluid collection.  Lucencies throughout the white matter are noted.     There is no evident fracture.  Mild to moderate mucoperiosteal thickening of the paranasal sinuses.     The upper cervical, petrous, cavernous, and supraclinoid internal carotid arteries are unremarkable.  An anterior communicating artery is seen.  The branches of the anterior cerebral and middle cerebral arteries are unremarkable.  A small right posterior   communicating artery is seen along with a small left posterior communicating artery.  The branches of the posterior cerebral and superior cerebellar arteries are unremarkable.  The basilar artery has a normal course and caliber.  The bilateral vertebral   arteries are unremarkable.  Takeoff of the left PICA is noted     There is a 3-vessel aortic arch.  The origins of the branch vessels appear widely patent.  The  bilateral subclavian arteries and innominate artery are unremarkable.     Calcified plaques at the bifurcation of bilateral common carotid arteries is noted.  No significant stenosis.  There is no evidence of hemodynamically significant stenosis in the carotid bulbs by NASCET criteria.  The cervical internal carotid arteries  are widely patent.     The vertebral arteries originate from the subclavian arteries.  The origins of the vertebral arteries are patent.  The cervical vertebral arteries are widely patent.  The left vertebral artery is mildly dominant.                      Impression   CONCLUSION:       1. Sequelae of moderate chronic small vessel ischemic disease.  No evidence of hemorrhage or extra-axial fluid collection.     2. Prominent right perihilar lymph node measures 2.6 x 2.1 cm.  Interstitial abnormalities in the lungs is noted.     3. No significant stenosis or aneurysmal dilatation involving the major arterial structures in the head or neck      MRI brain MRA brain   MRI BRAIN  FINDINGS:       Mild prominence of the sulci.       There is no midline shift or mass effect.       The basal cisterns are patent.       The craniocervical junction is unremarkable.       Midline structures including corpus callosum, optic chiasm and cerebellar tonsils are overall unremarkable.     There is no acute intracranial hemorrhage or extra-axial fluid collection identified.       Marked FLAIR abnormalities the white matter.       Susceptibility in the left parasagittal frontal lobe along with the right parasagittal parietal lobe is noted.  Susceptibility in the region of the right dentate nucleus is noted.     There is no restricted diffusion to suggest acute ischemia/infarction.     The visualized paranasal sinuses and mastoid air cells are unremarkable.       The expected major intracranial flow voids are present.     MRA BRAIN  The upper cervical, petrous, cavernous, and supraclinoid internal carotid arteries are  unremarkable.       An anterior communicating artery is seen.       The branches of the anterior cerebral and middle cerebral arteries are unremarkable.       Marked severe stenosis in the proximal left P2 segment is noted.  Left posterior cerebral artery distal to this is patent.     The basilar artery has a normal course and caliber.       The bilateral vertebral arteries are unremarkable.  The origins of the bilateral PICA are seen.                     Impression   CONCLUSION:       1. No evidence of an acute infarct.     2. Sequelae of marked severe chronic small vessel ischemic disease.     3. Mild global parenchymal volume loss.     4. Multiple foci of gradient susceptibility in the brain parenchyma may be sequelae of chronic hemorrhagic products.     5. 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  The left posterior cerebral artery distal to this is widely patent.     MR Brain DWI   FINDINGS:    There is an approximately 1.3 cm focus of restricted diffusion in the right thalamus compatible with acute infarct.  This is new in comparison to 12/16/2023.                   Impression   CONCLUSION:  Interval development of 1.3 cm focus of restricted diffusion in the right thalamus compatible with acute infarct, which is new from 12/16/2023.       TTE 12/17/23  Conclusions:     1. Left ventricle: The cavity size was normal. Wall thickness was at the      upper limits of normal. Systolic function was normal. The estimated      ejection fraction was 60-65%. Left ventricular diastolic function      parameters were normal for the patient's age.   2. Right ventricle: The cavity size was normal. Systolic function was      normal.   3. Left atrium: The left atrial volume was normal.   4. Atrial septum: Agitated saline contrast study showed no atrial level      shunt, at baseline or with provocation.   5. Tricuspid valve: There was mild regurgitation.   6. Pulmonary arteries: Systolic  pressure was moderately increased, estimated      to be 55mm Hg. Estimated pulmonary artery diastolic pressure was 7mm Hg.   7. Pericardium, extracardiac: There was no pericardial effusion.   Impressions:  No previous study from [facility] was   available for comparison. This study is compared with previous dated   3/9/2019: Improvement in pericardial effusion previously noted. Otherwise,   findings are comparable.     Assessment:  Torin Cam is a(n) 79 year old male with a medical history of hypertension who presents for stroke follow up. Presented with left sided weakness and left facial numbness (symptoms waxed and waned 12/2023). He reports missing antihypertensives for two days prior to admission, which is atypical for him. Initial MRI brain with marked chronic flair white matter changes and mild volume loss and SWI changes. Had recurrence of symptoms, repeat MRI brain with right thalamic ischemic stroke. CTA and MRA head/neck with some extracranial atherosclerotic disease and intracranial atherosclerotic disease (ayesha marked stenosis left PCA).  TTE with borderline high wall thickness LV, normal LA volume, no shunt, elevated pulmonary artery systolic pressure. A1C 6.1, LDL 57. Started on dual antiplatelet therapy with aspirin and plavix (not on antithrombotic prior to admission). Stroke mechanism favored to be small vessel/lacunar given location and morphology. Now on aspirin monotherapy given suspected stroke mechanism. BP remains elevated, would continue to work with cardiology and primary care provider (PCP) to continue med titration.     Left leg weakness and left face numbness persist since stroke, will pursue therapies and video swallow. No new symptoms since stroke except for branch retinal vein occlusion being managed by ophthalmology. Discussed trial of low dose gabapentin for neuropathic pain/paresthesias, he will first trial therapy given no significant facial pain, which I  support.     Plan:  -Stroke risk reduction:    -Cholesterol: goal LDL < 70 mg/dL, high intensity statin continue atorvastatin 40 mg daily (please have primary care doctor refill)   -Blood pressure: goal normotension   -Healthy diet (produce rich, DASH diet, Mediterranean diet)   -Regular exercise (Moderate to vigorous-intensity aerobic exercise for at least 30 minutes a day, 3 to 4 times a week)   -Antithrombotic: continue aspirin 81 mg daily indefinitely (he will start buying over the counter)   -Pre-Diabetes: work on good glucose control  -Physical therapy and speech therapy referral   -Video swallow study with radiology  -Let me know if you want to trial gabapentin low dose for neuropathic pain    1. Right thalamic stroke (HCC)  - Physical Therapy Referral - [facility]  - Speech Therapy Referral - [facility]  - XR VIDEO SWALLOW (CPT=74230); Future    2. Left leg weakness  - Physical Therapy Referral - [facility]    3. Numbness and tingling of left side of face  - Speech Therapy Referral - [facility]  - XR VIDEO SWALLOW (CPT=74230); Future    4. Dysphagia, unspecified type  - Speech Therapy Referral - [facility]  - XR VIDEO SWALLOW (CPT=74230); Future    Total time 31 minutes including chart review, eliciting history, physical exam, and counseling.    Jud Guo, DO

## 2024-10-18 ENCOUNTER — TELEPHONE (OUTPATIENT)
Dept: PHYSICAL THERAPY | Facility: HOSPITAL | Age: 79
End: 2024-10-18

## 2024-10-21 ENCOUNTER — OFFICE VISIT (OUTPATIENT)
Dept: PHYSICAL THERAPY | Facility: HOSPITAL | Age: 79
End: 2024-10-21
Attending: STUDENT IN AN ORGANIZED HEALTH CARE EDUCATION/TRAINING PROGRAM
Payer: MEDICARE

## 2024-10-21 DIAGNOSIS — I63.81 RIGHT THALAMIC STROKE (HCC): Primary | ICD-10-CM

## 2024-10-21 DIAGNOSIS — R29.898 LEFT LEG WEAKNESS: ICD-10-CM

## 2024-10-21 PROCEDURE — 97110 THERAPEUTIC EXERCISES: CPT

## 2024-10-21 PROCEDURE — 97161 PT EVAL LOW COMPLEX 20 MIN: CPT

## 2024-10-21 NOTE — PROGRESS NOTES
NEUROLOGICAL EVALUATION:     Diagnosis:       Right thalamic stroke (HCC) (I63.81)  Left leg weakness (R29.898)   Referring Provider: Jud Guo  Date of Evaluation:    10/21/2024    Precautions:  None Next MD visit:   none scheduled  Date of Surgery: n/a     PATIENT SUMMARY   Torin Cam is a 79 year old male who presents to therapy today with complaints of L LE weakness. Pt had a thalamic stroke back in Dec of 203 and was at [facility] for about a week. He is in speech for L facial numbness, and is having some trouble swallowing, has an order for a swallow eval. He feels numbness in his thumb and index finger on L side, but can use them. He feels his L leg is weak and heavy lift he cannot lift it well when walking. He has been to out patient therapy for a while after his stroke, but is back for his tune up. He denies falls in the last year.     MRI 12/18/23: CONCLUSION:  Interval development of 1.3 cm focus of restricted diffusion in the right thalamus compatible with acute infarct, which is new from 12/16/2023.       Pt describes pain level: denies pain, just weakness/heaviness in L leg.   Current functional limitations include: walking longer distances (uses hurrycane), has 16 stairs at home (uses hand rail), get's up to stand a lot slower. His vision is bad after the stroke too. Mentions [place] that can him feel dizzy. Does mention sensory changes in L groin (cramps).\    Social: he lives with his step daughter in her home.    Torin describes prior level of function: he was independent before, was walking without cane Pt goals include: get stronger to get L leg feeling better, walk better.  Past medical history was reviewed with Torin. Significant findings include:  has a past medical history of Essential hypertension, High blood pressure, and Stroke ([facility]).   Pt denies diplopia, dysarthria, dysphasia, dizziness, drop attacks, bowel/bladder changes, saddle anesthesia, and MICHAEL LE  N/T.    ASSESSMENT  Torin presents to physical therapy evaluation with primary c/o L LE weakness and facial numbness. The results of the objective tests and measures show decreased L LE strength compared to R, decent static balance, slower functional mobility via tug and 30 sec sit to stand.  Functional deficits include but are not limited to sit to stand, walking, getting out of car.  Signs and symptoms are consistent with diagnosis of R thalamic stroke. Pt and PT discussed evaluation findings, pathology, POC and HEP.  Pt voiced understanding and performs HEP correctly without reported pain. Skilled Physical Therapy is medically necessary to address the above impairments and reach functional goals.     OBJECTIVE:   Observation/Posture: fwd head  Sensation: Grossly intact to light touch MICHAEL UE/LE   Coordination:   Finger to Nose: WNL  Pronation/Supination: WNL  Toe Tap: WNL    Deep Tendon Reflexes: 2+ B patellar and extensor wad    Tone: WNL B LE and UE, no clonus noted B    MICHAEL AROM and Strength WNL except below bilateral  (* denotes performed with pain)  Shoulder AROM MMT (-/5)     Flexion  5/5 B     Extension  NT     Abduction  5/5     External Rot  5/5     Internal Rot  R: 5, L:4+       Hip AROM MMT (-/5)     Flexion  R:5, L:4     Extension  NT     Abduction  R:4+, L:4     External Rot  R:5, L:4-   Knee AROM MMT (-/5)     Flexion  R:5, L:4_     Extension  R:5 L:4-   Foot / Ankle AROM MMT (-/5)     DF  R:5, L;4-     PF  R:5, L;4       Postural Control:    4-Stage Balance Test:   - Feet together: 30 sec  EC:30\"   - Modified Tandem:  30 sec   - Tandem Stance: 20 sec B Fall Risk: no   - SLS: R 20 sec, L 20 sec (a bit shaky) Fall Risk: no    Functional Mobility:  30 sec sit<>stand: 6x with no UE assist   Fall Risk: yes  Gait: pt ambulates on level ground with assistive device of hurrycane, decreased step length B .   Timed Up and Go (AD, time): 15.21 sec  Fall Risk: yes  Dynamic Gait Index Score: NT here/24 Fall  Risk: NT    Today’s Treatment and Response:   Pt education was provided on exam findings, treatment diagnosis, treatment plan, expectations, and prognosis. Pt was also provided recommendations for activity modifications, possible soreness after evaluation, postural corrections, detrimental fear avoidance behaviors, importance of remaining active, and strategies to reduce fall risk at home.  Patient was instructed in and issued a HEP for:   Access Code: [email or web address]  URL: [email or web address]  Date: 10/21/2024  Prepared by: Shalini Rosales    Exercises  - Standing Tandem Balance with Counter Support  - 1 x daily - 7 x weekly - 3 sets - 20 hold  - Mini Squat with Counter Support  - 1 x daily - 7 x weekly - 2 sets - 10 reps  - Supine Bridge  - 1 x daily - 7 x weekly - 2 sets - 10 reps  - Standing March with Counter Support  - 1 x daily - 7 x weekly - 2 sets - 10 reps  - Standing Hip Abduction with Counter Support  - 1 x daily - 7 x weekly - 2 sets - 10 reps  Therex (8 min)  Charges: PT Eval Low Complexity, therex:1      Total Timed Treatment: 10 min     Total Treatment Time: 45 min     PLAN OF CARE:    Goals: (to be met in 8-10 visits)  Pt will have improved L hip flexor strength to 5/5 in order to improve safety on stairs.  Pt will demo improved DGI to 24/24 in order to prevent falls.  Pt will improve hip L ABD and ER strength to 5/5 to increase ease with standing and walking    Pt will demonstrate improved SLS to >20 seconds MICHAEL to promote safety and decrease risk of falls on uneven surfaces such as grass and gravel   Pt will perform TUG in <10 seconds, demonstrating improved gait speed for improved community ambulation  Pt will be independent and compliant with comprehensive HEP to maintain progress achieved in PT      Frequency / Duration: Patient will be seen for 1-2 x/week or a total of 8-10 visits over a 30 day period.  Treatment will include: Gait training, Manual Therapy, Neuromuscular  Re-education, Self-Care Home Management, Therapeutic Activities, and Therapeutic Exercise    Education or treatment limitation: None  Rehab Potential:excellent    FES Score  No data recorded  No data recorded    Patient/Family/Caregiver was advised of these findings, precautions, and treatment options and has agreed to actively participate in planning and for this course of care.    Thank you for your referral. Please co-sign or sign and return this letter via fax as soon as possible to [phone]. If you have any questions, please contact me at Dept: [phone]    Sincerely,  Electronically signed by therapist: Shalini Rosales, PT, DPT  Physician's certification required: Yes  I certify the need for these services furnished under this plan of treatment and while under my care.    X___________________________________________________ Date____________________    Certification From: 10/21/2024  To:1/19/2025

## 2024-10-28 ENCOUNTER — OFFICE VISIT (OUTPATIENT)
Dept: PHYSICAL THERAPY | Facility: HOSPITAL | Age: 79
End: 2024-10-28
Attending: STUDENT IN AN ORGANIZED HEALTH CARE EDUCATION/TRAINING PROGRAM
Payer: MEDICARE

## 2024-10-28 PROCEDURE — 97110 THERAPEUTIC EXERCISES: CPT

## 2024-10-28 PROCEDURE — 97112 NEUROMUSCULAR REEDUCATION: CPT

## 2024-10-28 NOTE — PROGRESS NOTES
Diagnosis:      Right thalamic stroke (HCC) (I63.81)  Left leg weakness (R29.898)   Referring Provider: Jud Guo  Date of Evaluation:    10/21/2024    Precautions:  None Next MD visit:   none scheduled  Date of Surgery: n/a   Insurance Primary/Secondary: [employer] MCR / N/A     # Auth Visits: 8 visit auth Graciela            Subjective: Pt reports that his face numbness on the L is bothering him quite a bit. He is doing well else wise. He has been doing his HEP.    Pain: no pain noted      Objective:     MMT  Hip MMT (-/5)     Flexion R:5, L:4     Extension NT     Abduction R:4+, L:4     External Rot R:5, L:4-   Knee MMT (-/5)     Flexion R:5, L:4_     Extension R:5 L:4-   Foot / Ankle MMT (-/5)     DF R:5, L;4-     PF R:5, L;4       Postural Control:     4-Stage Balance Test:               - Feet together: 30 sec  EC:30\"               - Modified Tandem:  30 sec               - Tandem Stance: 20 sec B         Fall Risk: no               - SLS: R 20 sec, L 20 sec (a bit shaky)    Fall Risk: no     Functional Mobility:  30 sec sit<>stand: 6x with no UE assist                             Fall Risk: yes  Gait: pt ambulates on level ground with assistive device of hurrycane, decreased step length B .   Timed Up and Go (AD, time): 15.21 sec  Fall Risk: yes  Dynamic Gait Index Score:  Dynamic Gait Index   Item Description Score (0 worst, 3 best)     ____2___1. Gait Level Surface-  Time: ____9.96______seconds  ___2___2. Change in gait speed  _____2__3. Gait with horizontal head turns   _____2__4. Gait with vertical head turns  ____3___5. Gait and pivot turn  ____2___6. Step over obstacle  _____2__7. Step around obstacles  ____2___8. Steps    Score: 18/24 (<19 indicates fall risk)      Assessment: Pt scored a 18/24 on DGI, which is a fall risk. He performed with out his cane but was unsteady during most of the activities. Added global hip strengthening as he is weak in most major mm groups on the L. He tolerated these well  and required minimal cues for form on squats. Torin responded well to all exercises, will add more dynamic gait next time.      Goals:   (to be met in 8-10 visits)  Pt will have improved L hip flexor strength to 5/5 in order to improve safety on stairs.  Pt will demo improved DGI to 24/24 in order to prevent falls.  Pt will improve hip L ABD and ER strength to 5/5 to increase ease with standing and walking    Pt will demonstrate improved SLS to >20 seconds MICHAEL to promote safety and decrease risk of falls on uneven surfaces such as grass and gravel   Pt will perform TUG in <10 seconds, demonstrating improved gait speed for improved community ambulation  Pt will be independent and compliant with comprehensive HEP to maintain progress achieved in PT      Plan: pt will progress dynamic balance.  Date: 10/28/2024  TX#: 2/8 Date:                 TX#: 3/ Date:                 TX#: 4/ Date:                 TX#: 5/ Date:   Tx#: 6/   Therex:  5 min recum bike level 5       3x10 DL shuttle press, 62 lbs  2x10 posterior lunge on glider       NR-ed:  3x tandem walk on airex beam  15x D1 UE PNF with 4 lb med ball, romberg on airex. B       DGIx 5 min       Therex:  10x fwd step up, B, HHA, 6\"  10x lateral step ups B, HHA, 6\"  15x squats holding 9 lb weight  2x10 resisted hip flexion green tband       HEP: Access Code: [email or web address]  URL: [email or web address]  Date: 10/21/2024  Prepared by: Shalini Rosales     Exercises  - Standing Tandem Balance with Counter Support  - 1 x daily - 7 x weekly - 3 sets - 20 hold  - Mini Squat with Counter Support  - 1 x daily - 7 x weekly - 2 sets - 10 reps  - Supine Bridge  - 1 x daily - 7 x weekly - 2 sets - 10 reps  - Standing March with Counter Support  - 1 x daily - 7 x weekly - 2 sets - 10 reps  - Standing Hip Abduction with Counter Support  - 1 x daily - 7 x weekly - 2 sets - 10 reps    Charges: threx:2. Neuro:1       Total Timed Treatment: 45 min  Total Treatment Time: 45  min

## 2024-10-30 ENCOUNTER — OFFICE VISIT (OUTPATIENT)
Dept: PHYSICAL THERAPY | Facility: HOSPITAL | Age: 79
End: 2024-10-30
Attending: STUDENT IN AN ORGANIZED HEALTH CARE EDUCATION/TRAINING PROGRAM
Payer: MEDICARE

## 2024-10-30 PROCEDURE — 97112 NEUROMUSCULAR REEDUCATION: CPT

## 2024-10-30 PROCEDURE — 97116 GAIT TRAINING THERAPY: CPT

## 2024-10-30 PROCEDURE — 97110 THERAPEUTIC EXERCISES: CPT

## 2024-10-30 NOTE — PROGRESS NOTES
Diagnosis:      Right thalamic stroke (HCC) (I63.81)  Left leg weakness (R29.898)   Referring Provider: Jud Gou  Date of Evaluation:    10/21/2024    Precautions:  None Next MD visit:   none scheduled  Date of Surgery: n/a   Insurance Primary/Secondary: HU[employer]NA MCR / N/A     # Auth Visits: 8 visit auth Graciela            Subjective: Pt reports that he is doing well today, he has been doing his HEP.    Pain: no pain noted      Objective:     MMT  Hip MMT (-/5)     Flexion R:5, L:4     Extension NT     Abduction R:4+, L:4     External Rot R:5, L:4-   Knee MMT (-/5)     Flexion R:5, L:4_     Extension R:5 L:4-   Foot / Ankle MMT (-/5)     DF R:5, L;4-     PF R:5, L;4       Postural Control:     4-Stage Balance Test:               - Feet together: 30 sec  EC:30\"               - Modified Tandem:  30 sec               - Tandem Stance: 20 sec B         Fall Risk: no               - SLS: R 20 sec, L 20 sec (a bit shaky)    Fall Risk: no     Functional Mobility:  30 sec sit<>stand: 6x with no UE assist                             Fall Risk: yes  Gait: pt ambulates on level ground with assistive device of hurrycane, decreased step length B .   Timed Up and Go (AD, time): 15.21 sec  Fall Risk: yes  Dynamic Gait Index Score:  Dynamic Gait Index   Item Description Score (0 worst, 3 best)     ____2___1. Gait Level Surface-  Time: ____9.96______seconds  ___2___2. Change in gait speed  _____2__3. Gait with horizontal head turns   _____2__4. Gait with vertical head turns  ____3___5. Gait and pivot turn  ____2___6. Step over obstacle  _____2__7. Step around obstacles  ____2___8. Steps    Score: 18/24 (<19 indicates fall risk)      Assessment: Torin franklin improved motor control and balance with walking head turns and backwards walk. He has less LOB and gait deviations, though he does tend to lean posteriorly. Goof tolerance to backwards walkouts and was able to suleiman increased load for leg press on his second set. He tends to  trust his L side more though it is his weak side.      Goals:   (to be met in 8-10 visits)  Pt will have improved L hip flexor strength to 5/5 in order to improve safety on stairs.  Pt will demo improved DGI to 24/24 in order to prevent falls.  Pt will improve hip L ABD and ER strength to 5/5 to increase ease with standing and walking    Pt will demonstrate improved SLS to >20 seconds MICHAEL to promote safety and decrease risk of falls on uneven surfaces such as grass and gravel   Pt will perform TUG in <10 seconds, demonstrating improved gait speed for improved community ambulation  Pt will be independent and compliant with comprehensive HEP to maintain progress achieved in PT      Plan: pt will progress dynamic balance.  Date: 10/28/2024  TX#: 2/8 Date:10/30/2024                TX#: 3/8 Date:                 TX#: 4/ Date:                 TX#: 5/ Date:   Tx#: 6/   Therex:  5 min recum bike level 5 5 min recum bike level 5      3x10 DL shuttle press, 62 lbs  2x10 posterior lunge on glider Therex:  3x lateral walk green spri  2x10 standing hip ext, green spri      NR-ed:  3x tandem walk on airex beam  15x D1 UE PNF with 4 lb med ball, romberg on airex. B NR-ed:  20x step tap to airex quickly, B  15x fwd lunge to bosu light HHA B      DGIx 5 min Dynamic gait:  Walking with horz and vert HT in open with SBA 2x60 feet  2x60\" walking backward SBA no cane      Therex:  10x fwd step up, B, HHA, 6\"  10x lateral step ups B, HHA, 6\"  15x squats holding 9 lb weight  2x10 resisted hip flexion green tband Therex:  10x retro walk 30 lbs cable mach, SBA  3x10 DL shuttle press, 62 lbs first set, 87 lbs second  15x squats with 10 lb weight       NR-ed:  2x tandem walk with thoracic twists  2x20\" SLS, B       HEP: Access Code: [email or web address]  URL: [email or web address]  Date: 10/21/2024  Prepared by: Shalini Rosales     Exercises  - Standing Tandem Balance with Counter Support  - 1 x daily - 7 x weekly - 3 sets - 20 hold  -  Mini Squat with Counter Support  - 1 x daily - 7 x weekly - 2 sets - 10 reps  - Supine Bridge  - 1 x daily - 7 x weekly - 2 sets - 10 reps  - Standing March with Counter Support  - 1 x daily - 7 x weekly - 2 sets - 10 reps  - Standing Hip Abduction with Counter Support  - 1 x daily - 7 x weekly - 2 sets - 10 reps    Charges: threx:1.gait;1  Neuro:1       Total Timed Treatment: 45 min  Total Treatment Time: 45 min

## 2024-11-06 ENCOUNTER — OFFICE VISIT (OUTPATIENT)
Dept: PHYSICAL THERAPY | Facility: HOSPITAL | Age: 79
End: 2024-11-06
Attending: STUDENT IN AN ORGANIZED HEALTH CARE EDUCATION/TRAINING PROGRAM
Payer: MEDICARE

## 2024-11-06 PROCEDURE — 97116 GAIT TRAINING THERAPY: CPT

## 2024-11-06 PROCEDURE — 97110 THERAPEUTIC EXERCISES: CPT

## 2024-11-06 PROCEDURE — 97112 NEUROMUSCULAR REEDUCATION: CPT

## 2024-11-06 NOTE — PROGRESS NOTES
Diagnosis:      Right thalamic stroke (HCC) (I63.81)  Left leg weakness (R29.898)   Referring Provider: Jud Guo  Date of Evaluation:    10/21/2024    Precautions:  None Next MD visit:   none scheduled  Date of Surgery: n/a   Insurance Primary/Secondary: HUMANA MCR / N/A     # Auth Visits: 8 visit auth Graciela            Subjective: Pt reports that he is doing better with the exercises, no major changes to report.    Pain: no pain noted      Objective:     MMT  Hip MMT (-/5)     Flexion R:5, L:4     Extension NT     Abduction R:4+, L:4     External Rot R:5, L:4-   Knee MMT (-/5)     Flexion R:5, L:4_     Extension R:5 L:4-   Foot / Ankle MMT (-/5)     DF R:5, L;4-     PF R:5, L;4       Postural Control:     4-Stage Balance Test:               - Feet together: 30 sec  EC:30\"               - Modified Tandem:  30 sec               - Tandem Stance: 20 sec B         Fall Risk: no               - SLS: R 20 sec, L 20 sec (a bit shaky)    Fall Risk: no     Functional Mobility:  30 sec sit<>stand: 10x (improved from  6x) with no UE assist                             Fall Risk: yes  Gait: pt ambulates on level ground with assistive device of hurrycane, decreased step length B .   Timed Up and Go (AD, time): 15.21 sec  Fall Risk: yes  Dynamic Gait Index Score:  Dynamic Gait Index   Item Description Score (0 worst, 3 best)     ____2___1. Gait Level Surface-  Time: ____9.96______seconds  ___2___2. Change in gait speed  _____2__3. Gait with horizontal head turns   _____2__4. Gait with vertical head turns  ____3___5. Gait and pivot turn  ____2___6. Step over obstacle  _____2__7. Step around obstacles  ____2___8. Steps    Score: 18/24 (<19 indicates fall risk)      Assessment: Pt tolerated shuttle press without pain and with good form, even on then the L. His 30 sec sit to stand score improved from 6 to 10x today. He is also more steady on his feet without a cane, but starts to veer and trip over his feet a bit with head turns.  Pt is progressing well towards LTG and will cont to benefit from further therapy to meet them.    Goals:   (to be met in 8-10 visits)  Pt will have improved L hip flexor strength to 5/5 in order to improve safety on stairs.  Pt will demo improved DGI to 24/24 in order to prevent falls.  Pt will improve hip L ABD and ER strength to 5/5 to increase ease with standing and walking    Pt will demonstrate improved SLS to >20 seconds MICHAEL to promote safety and decrease risk of falls on uneven surfaces such as grass and gravel   Pt will perform TUG in <10 seconds, demonstrating improved gait speed for improved community ambulation Progressing.  Pt will be independent and compliant with comprehensive HEP to maintain progress achieved in PT  Progressing.    Plan: Progress walking without AD. Re-test tug next time.  Date: 10/28/2024  TX#: 2/8 Date:10/30/2024                TX#: 3/8 Date: 11/6/2024                 TX#: 4/8 Date:                 TX#: 5/ Date:   Tx#: 6/   Therex:  5 min recum bike level 5 5 min recum bike level 5 5 min recum bike level 5     3x10 DL shuttle press, 62 lbs  2x10 posterior lunge on glider Therex:  3x lateral walk green spri  2x10 standing hip ext, green spri Therex:  2x10 lateral step downs, 6\", B, HHA  15x split squats, trailing leg on 6\" step, B; HHA     NR-ed:  3x tandem walk on airex beam  15x D1 UE PNF with 4 lb med ball, romberg on airex. B NR-ed:  20x step tap to airex quickly, B  15x fwd lunge to bosu light HHA B Gait:  Walking without AD 2x50 feet  Walking with horiz HT called out, x80 feet, CGA as it was challenging     DGIx 5 min Dynamic gait:  Walking with horz and vert HT in open with SBA 2x60 feet  2x60\" walking backward SBA no cane Therex:  2x10  SL shuttle press, 37 lbs, B  2x10 DL press, 75 lbs  2x10 hamstring curls, 25 lbs at cable mach, B     Therex:  10x fwd step up, B, HHA, 6\"  10x lateral step ups B, HHA, 6\"  15x squats holding 9 lb weight  2x10 resisted hip flexion green tband  Therex:  10x retro walk 30 lbs cable mach, SBA  3x10 DL shuttle press, 62 lbs first set, 87 lbs second  15x squats with 10 lb weight Nr-ed:  2x10 alternating reaches to ski poles in mod SLS, B with CGA  2x30\" tandem stance with horz HT  2x20\" SLS B, no HHA      NR-ed:  2x tandem walk with thoracic twists  2x20\" SLS, B  Therex:  2x10 standing hip ext green bands     HEP: Access Code: [email or web address]  URL: [email or web address]  Date: 10/21/2024  Prepared by: Shalini Rosales     Exercises  - Standing Tandem Balance with Counter Support  - 1 x daily - 7 x weekly - 3 sets - 20 hold  - Mini Squat with Counter Support  - 1 x daily - 7 x weekly - 2 sets - 10 reps  - Supine Bridge  - 1 x daily - 7 x weekly - 2 sets - 10 reps  - Standing March with Counter Support  - 1 x daily - 7 x weekly - 2 sets - 10 reps  - Standing Hip Abduction with Counter Support  - 1 x daily - 7 x weekly - 2 sets - 10 reps    Charges: threx:1.gait;1  Neuro:1       Total Timed Treatment: 45 min  Total Treatment Time: 45 min

## 2024-11-07 ENCOUNTER — HOSPITAL ENCOUNTER (OUTPATIENT)
Dept: GENERAL RADIOLOGY | Facility: HOSPITAL | Age: 79
Discharge: HOME | End: 2024-11-07
Attending: STUDENT IN AN ORGANIZED HEALTH CARE EDUCATION/TRAINING PROGRAM
Payer: MEDICARE

## 2024-11-07 DIAGNOSIS — I63.81 RIGHT THALAMIC STROKE (HCC): ICD-10-CM

## 2024-11-07 DIAGNOSIS — R20.2 NUMBNESS AND TINGLING OF LEFT SIDE OF FACE: ICD-10-CM

## 2024-11-07 DIAGNOSIS — R13.10 DYSPHAGIA, UNSPECIFIED TYPE: ICD-10-CM

## 2024-11-07 DIAGNOSIS — R20.0 NUMBNESS AND TINGLING OF LEFT SIDE OF FACE: ICD-10-CM

## 2024-11-07 PROCEDURE — 74230 X-RAY XM SWLNG FUNCJ C+: CPT | Performed by: STUDENT IN AN ORGANIZED HEALTH CARE EDUCATION/TRAINING PROGRAM

## 2024-11-07 PROCEDURE — 92611 MOTION FLUOROSCOPY/SWALLOW: CPT

## 2024-11-07 NOTE — PROGRESS NOTES
ADULT VIDEOFLUOROSCOPIC SWALLOWING STUDY    Admission Date: 11/7/2024  Evaluation Date: 11/07/24  Radiologist: Elysia    RECOMMENDATIONS   Diet Recommendations - Solids: Regular  Diet Recommendations - Liquids: Thin Liquids    Further Follow-up:        Compensatory Strategies Recommended: Alternate consistencies  Aspiration Precautions: Upright position  Medication Administration Recommendations: One pill at a time       HISTORY   Background/Objective Information:    Pt familiar to department; on 12/17/2023 received a BSSE following diagnosis of a thalamic stroke; a regular solids and thin liquids diet with use of aspiration precautions was recommended. Since thalamic stroke in December 2023, patient complains of solids, liquids, and saliva sticking in his throat. Pt is currently on a regular solids and thin liquids diet.    Problem List  Active Problems:    * No active hospital problems. *      Past Medical History  Past Medical History:    Essential hypertension    High blood pressure    Stroke (HCC)     Current Diet Consistency: Regular;Thin liquids      Precautions: None  Imaging results: MRI 12/18/2023:  CONCLUSION:  Interval development of 1.3 cm focus of restricted diffusion in the right thalamus compatible with acute infarct, which is new from 12/16/2023.     Reason for Referral:  (Pt reports feeling food sticking in throat)    Family/Patient Goals:  Discover etiology of symptoms      ASSESSMENT   DYSPHAGIA ASSESSMENT  Test completed in conjunction with Radiologist.  Patient Positioned: Upright;Midline;Standard Chair.  Patient Viewed: Lateral.   .  Consistencies Presented: Thin liquids;Puree;Hard solid to assess oropharyngeal swallow function and assess for compensatory strategies to improve safe swallow function.    THIN LIQUIDS  Method of Presentation: Teaspoon;Cup;Straw  Oral Phase of Swallow (VFSS - Thin Liquids): Within Functional Limits  Triggered at: Base of tongue;Valleculae  Residue Severity,  Location: Trace;Valleculae  Laryngeal Penetration: None  Tracheal Aspiration: None        PUREE  Oral Phase of Swallow (VFSS - Puree): Within Functional Limits  Triggered at: Base of tongue  Laryngeal Penetration: None  Tracheal Aspiration: None     HARD SOLID  Oral Phase of Swallow (VFSS - Hard Solid): Within Functional Limits  Triggered at: Valleculae  Laryngeal Penetration: None  Tracheal Aspiration: None  Penetration Aspiration Scale Score: Score 1: Material does not enter airway       Overall Impression: Radiologist present in room, pt positioned upright and midline in standard chair and observed self-feeding po trials of thin liquids via spoon, cup and straw; puree and hard solid. Results of exam revealed functional oral and pharyngeal swallow mechanism. Oral phase characterized by adequate retrieval and containment of bolus, complete mastication, timely oral transit, and complete clearing of the oral cavity. Pharyngeal phase characterized by timely initiation of the pharyngeal response, adequate anterior hyoid excursion, complete epiglottic inversion, and complete laryngeal vestibular closure. No laryngeal penetration or tracheal aspiration with any consistencies.    Patient with sensation of globus. Patient's sensation may be referred sensation from esophagus. Following exam, discussed results with patient and family; discussed referring to primary physician to consider results of this exam; possibility of receiving an esophagram and/or GI consult to further investigate his complaints.    EDUCATION/INSTRUCTION  Reviewed results and recommendations with patient and family.  Agreement/Understanding verbalized and all questions answered to their apparent satisfaction.    INTERDISCIPLINARY COMMUNICATION  Reviewed results with Radiologist; agreement verbalized.    Patient Experiencing Pain: No      Thank you for your referral.   If you have any questions, please contact PETE Wilkins

## 2024-11-11 ENCOUNTER — OFFICE VISIT (OUTPATIENT)
Dept: PHYSICAL THERAPY | Facility: HOSPITAL | Age: 79
End: 2024-11-11
Attending: STUDENT IN AN ORGANIZED HEALTH CARE EDUCATION/TRAINING PROGRAM
Payer: MEDICARE

## 2024-11-11 PROCEDURE — 97110 THERAPEUTIC EXERCISES: CPT

## 2024-11-11 PROCEDURE — 97116 GAIT TRAINING THERAPY: CPT

## 2024-11-11 PROCEDURE — 97112 NEUROMUSCULAR REEDUCATION: CPT

## 2024-11-11 NOTE — PROGRESS NOTES
Diagnosis:      Right thalamic stroke (HCC) (I63.81)  Left leg weakness (R29.898)   Referring Provider: Jud Guo  Date of Evaluation:    10/21/2024    Precautions:  None Next MD visit:   none scheduled  Date of Surgery: n/a   Insurance Primary/Secondary: [employer] MCR / N/A     # Auth Visits: 8 visit auth [employer]            Subjective:   Pt. Reports not new complaints.     Pain: no pain noted      Objective: Refer to flow sheet.   TUG : 10.01 seconds no AD    MMT  Hip MMT (-/5)     Flexion R:5, L:4     Extension NT     Abduction R:4+, L:4     External Rot R:5, L:4-   Knee MMT (-/5)     Flexion R:5, L:4_     Extension R:5 L:4-   Foot / Ankle MMT (-/5)     DF R:5, L;4-     PF R:5, L;4       Postural Control:     4-Stage Balance Test:               - Feet together: 30 sec  EC:30\"               - Modified Tandem:  30 sec               - Tandem Stance: 20 sec B         Fall Risk: no               - SLS: R 20 sec, L 20 sec (a bit shaky)    Fall Risk: no     Functional Mobility:  30 sec sit<>stand: 10x (improved from  6x) with no UE assist                             Fall Risk: yes  Gait: pt ambulates on level ground with assistive device of hurrycane, decreased step length B .   Timed Up and Go (AD, time): 15.21 sec  Fall Risk: yes  Dynamic Gait Index Score:  Dynamic Gait Index   Item Description Score (0 worst, 3 best)     ____2___1. Gait Level Surface-  Time: ____9.96______seconds  ___2___2. Change in gait speed  _____2__3. Gait with horizontal head turns   _____2__4. Gait with vertical head turns  ____3___5. Gait and pivot turn  ____2___6. Step over obstacle  _____2__7. Step around obstacles  ____2___8. Steps    Score: 18/24 (<19 indicates fall risk)      Assessment: Tolerated session well. Progressed to sand dune. TUG improved to 10.01 seconds, no AD. Pt is progressing well towards LTG and will cont to benefit from further therapy to meet them.    Goals:   (to be met in 8-10 visits)  Pt will have improved L hip  flexor strength to 5/5 in order to improve safety on stairs.  Pt will demo improved DGI to 24/24 in order to prevent falls.  Pt will improve hip L ABD and ER strength to 5/5 to increase ease with standing and walking    Pt will demonstrate improved SLS to >20 seconds MICHAEL to promote safety and decrease risk of falls on uneven surfaces such as grass and gravel   Pt will perform TUG in <10 seconds, demonstrating improved gait speed for improved community ambulation Progressing.  Pt will be independent and compliant with comprehensive HEP to maintain progress achieved in PT  Progressing.    Plan: Progress walking without AD.   Date: 10/28/2024  TX#: 2/8 Date:10/30/2024                TX#: 3/8 Date: 11/6/2024                 TX#: 4/8 Date:     11/11/24            TX#: 5/8 Date:   Tx#: 6/   Therex:  5 min recum bike level 5 5 min recum bike level 5 5 min recum bike level 5 5 min recum bike level 5    3x10 DL shuttle press, 62 lbs  2x10 posterior lunge on glider Therex:  3x lateral walk green spri  2x10 standing hip ext, green spri Therex:  2x10 lateral step downs, 6\", B, HHA  15x split squats, trailing leg on 6\" step, B; HHA Therex:  2x10 lateral step downs, 6\", B, HHA  15x split squats, trailing leg on 6\" step, B; HHA    NR-ed:  3x tandem walk on airex beam  15x D1 UE PNF with 4 lb med ball, romberg on airex. B NR-ed:  20x step tap to airex quickly, B  15x fwd lunge to bosu light HHA B Gait:  Walking without AD 2x50 feet  Walking with horiz HT called out, x80 feet, CGA as it was challenging Gait:  Walking without AD 2x50 feet  Walking with horiz HT called out, x80 feet, CGA as it was challenging    DGIx 5 min Dynamic gait:  Walking with horz and vert HT in open with SBA 2x60 feet  2x60\" walking backward SBA no cane Therex:  2x10  SL shuttle press, 37 lbs, B  2x10 DL press, 75 lbs  2x10 hamstring curls, 25 lbs at cable mach, B Therex:  2x10  SL shuttle press, 37 lbs, B  2x10 DL press, 75 lbs    Therex:  10x fwd step up,  B, HHA, 6\"  10x lateral step ups B, HHA, 6\"  15x squats holding 9 lb weight  2x10 resisted hip flexion green tband Therex:  10x retro walk 30 lbs cable mach, SBA  3x10 DL shuttle press, 62 lbs first set, 87 lbs second  15x squats with 10 lb weight Nr-ed:  2x10 alternating reaches to ski poles in mod SLS, B with CGA  2x30\" tandem stance with horz HT  2x20\" SLS B, no HHA Nr-ed:  Sand dune  *step ups x 10  *fwd lunge x 10  *lat lunge x 10  *squats x 10  *toe/heel raises x 10  *DLS balance with head turns x 30 seconds  2x30\" tandem stance with horz HT  2x20\" SLS B, no HHA  Bridge c SB x 10     NR-ed:  2x tandem walk with thoracic twists  2x20\" SLS, B  Therex:  2x10 standing hip ext green bands Therex:  DKTC c SB x 10  LTR c SB x 10  Passive stretching B LE's by PT    HEP: Access Code: [email or web address]  URL: [email or web address]  Date: 10/21/2024  Prepared by: Shalini Rosales     Exercises  - Standing Tandem Balance with Counter Support  - 1 x daily - 7 x weekly - 3 sets - 20 hold  - Mini Squat with Counter Support  - 1 x daily - 7 x weekly - 2 sets - 10 reps  - Supine Bridge  - 1 x daily - 7 x weekly - 2 sets - 10 reps  - Standing March with Counter Support  - 1 x daily - 7 x weekly - 2 sets - 10 reps  - Standing Hip Abduction with Counter Support  - 1 x daily - 7 x weekly - 2 sets - 10 reps    Charges: threx:1.gait;1  Neuro:1       Total Timed Treatment: 45 min  Total Treatment Time: 45 min

## 2024-11-12 ENCOUNTER — TELEPHONE (OUTPATIENT)
Dept: PHYSICAL THERAPY | Facility: HOSPITAL | Age: 79
End: 2024-11-12

## 2024-11-13 ENCOUNTER — APPOINTMENT (OUTPATIENT)
Dept: PHYSICAL THERAPY | Facility: HOSPITAL | Age: 79
End: 2024-11-13
Attending: STUDENT IN AN ORGANIZED HEALTH CARE EDUCATION/TRAINING PROGRAM
Payer: MEDICARE

## 2024-11-13 ENCOUNTER — APPOINTMENT (OUTPATIENT)
Dept: SPEECH THERAPY | Facility: HOSPITAL | Age: 79
End: 2024-11-13
Attending: STUDENT IN AN ORGANIZED HEALTH CARE EDUCATION/TRAINING PROGRAM
Payer: MEDICARE

## 2024-11-13 ENCOUNTER — TELEPHONE (OUTPATIENT)
Dept: PHYSICAL THERAPY | Facility: HOSPITAL | Age: 79
End: 2024-11-13

## 2024-11-18 ENCOUNTER — APPOINTMENT (OUTPATIENT)
Dept: PHYSICAL THERAPY | Facility: HOSPITAL | Age: 79
End: 2024-11-18
Attending: STUDENT IN AN ORGANIZED HEALTH CARE EDUCATION/TRAINING PROGRAM
Payer: MEDICARE

## 2024-11-20 ENCOUNTER — APPOINTMENT (OUTPATIENT)
Dept: SPEECH THERAPY | Facility: HOSPITAL | Age: 79
End: 2024-11-20
Attending: STUDENT IN AN ORGANIZED HEALTH CARE EDUCATION/TRAINING PROGRAM
Payer: MEDICARE

## 2024-11-20 ENCOUNTER — APPOINTMENT (OUTPATIENT)
Dept: PHYSICAL THERAPY | Facility: HOSPITAL | Age: 79
End: 2024-11-20
Attending: STUDENT IN AN ORGANIZED HEALTH CARE EDUCATION/TRAINING PROGRAM
Payer: MEDICARE

## 2024-11-25 ENCOUNTER — APPOINTMENT (OUTPATIENT)
Dept: PHYSICAL THERAPY | Facility: HOSPITAL | Age: 79
End: 2024-11-25
Attending: STUDENT IN AN ORGANIZED HEALTH CARE EDUCATION/TRAINING PROGRAM
Payer: MEDICARE

## 2024-11-27 ENCOUNTER — APPOINTMENT (OUTPATIENT)
Dept: SPEECH THERAPY | Facility: HOSPITAL | Age: 79
End: 2024-11-27
Attending: STUDENT IN AN ORGANIZED HEALTH CARE EDUCATION/TRAINING PROGRAM
Payer: MEDICARE

## 2024-12-02 ENCOUNTER — APPOINTMENT (OUTPATIENT)
Dept: PHYSICAL THERAPY | Facility: HOSPITAL | Age: 79
End: 2024-12-02
Attending: STUDENT IN AN ORGANIZED HEALTH CARE EDUCATION/TRAINING PROGRAM
Payer: MEDICARE

## 2024-12-04 ENCOUNTER — APPOINTMENT (OUTPATIENT)
Dept: PHYSICAL THERAPY | Facility: HOSPITAL | Age: 79
End: 2024-12-04
Attending: STUDENT IN AN ORGANIZED HEALTH CARE EDUCATION/TRAINING PROGRAM
Payer: MEDICARE

## 2024-12-04 ENCOUNTER — APPOINTMENT (OUTPATIENT)
Dept: SPEECH THERAPY | Facility: HOSPITAL | Age: 79
End: 2024-12-04
Attending: STUDENT IN AN ORGANIZED HEALTH CARE EDUCATION/TRAINING PROGRAM
Payer: MEDICARE

## 2024-12-09 ENCOUNTER — APPOINTMENT (OUTPATIENT)
Dept: PHYSICAL THERAPY | Facility: HOSPITAL | Age: 79
End: 2024-12-09
Attending: STUDENT IN AN ORGANIZED HEALTH CARE EDUCATION/TRAINING PROGRAM
Payer: MEDICARE

## 2024-12-11 ENCOUNTER — APPOINTMENT (OUTPATIENT)
Dept: SPEECH THERAPY | Facility: HOSPITAL | Age: 79
End: 2024-12-11
Attending: STUDENT IN AN ORGANIZED HEALTH CARE EDUCATION/TRAINING PROGRAM
Payer: MEDICARE

## 2024-12-11 ENCOUNTER — APPOINTMENT (OUTPATIENT)
Dept: PHYSICAL THERAPY | Facility: HOSPITAL | Age: 79
End: 2024-12-11
Attending: STUDENT IN AN ORGANIZED HEALTH CARE EDUCATION/TRAINING PROGRAM
Payer: MEDICARE